# Patient Record
Sex: FEMALE | Race: WHITE | Employment: UNEMPLOYED | URBAN - METROPOLITAN AREA
[De-identification: names, ages, dates, MRNs, and addresses within clinical notes are randomized per-mention and may not be internally consistent; named-entity substitution may affect disease eponyms.]

---

## 2017-06-29 ENCOUNTER — RADIANT APPOINTMENT (OUTPATIENT)
Dept: GENERAL RADIOLOGY | Facility: CLINIC | Age: 32
End: 2017-06-29
Attending: ORTHOPAEDIC SURGERY
Payer: COMMERCIAL

## 2017-06-29 DIAGNOSIS — R52 PAIN: ICD-10-CM

## 2017-06-29 PROCEDURE — 73610 X-RAY EXAM OF ANKLE: CPT | Mod: TC

## 2017-10-26 ENCOUNTER — TRANSFERRED RECORDS (OUTPATIENT)
Dept: HEALTH INFORMATION MANAGEMENT | Facility: CLINIC | Age: 32
End: 2017-10-26

## 2017-11-17 ENCOUNTER — PRE VISIT (OUTPATIENT)
Dept: MATERNAL FETAL MEDICINE | Facility: CLINIC | Age: 32
End: 2017-11-17

## 2018-01-12 ENCOUNTER — TELEPHONE (OUTPATIENT)
Dept: MATERNAL FETAL MEDICINE | Facility: CLINIC | Age: 33
End: 2018-01-12

## 2018-01-12 ENCOUNTER — PRE VISIT (OUTPATIENT)
Dept: MATERNAL FETAL MEDICINE | Facility: CLINIC | Age: 33
End: 2018-01-12

## 2018-01-12 DIAGNOSIS — Z33.1 PREGNANCY, INCIDENTAL: ICD-10-CM

## 2018-01-12 DIAGNOSIS — Q79.60 EHLERS-DANLOS SYNDROME: Primary | ICD-10-CM

## 2018-01-12 DIAGNOSIS — Z98.84 HISTORY OF GASTRIC BYPASS: ICD-10-CM

## 2018-01-12 NOTE — TELEPHONE ENCOUNTER
[facility] contacted Sabina to discuss adding L2 to GC/MFM consult rescheduled from 11/20. Sabina agreeable with coming earlier at 1:30p on 1/16. This ultrasound was added (ok'd per YY).

## 2018-01-16 ENCOUNTER — OFFICE VISIT (OUTPATIENT)
Dept: MATERNAL FETAL MEDICINE | Facility: CLINIC | Age: 33
End: 2018-01-16
Attending: NURSE PRACTITIONER
Payer: COMMERCIAL

## 2018-01-16 ENCOUNTER — HOSPITAL ENCOUNTER (OUTPATIENT)
Dept: ULTRASOUND IMAGING | Facility: CLINIC | Age: 33
Discharge: HOME | End: 2018-01-16
Attending: OBSTETRICS & GYNECOLOGY | Admitting: OBSTETRICS & GYNECOLOGY
Payer: COMMERCIAL

## 2018-01-16 DIAGNOSIS — Q79.60 EHLERS-DANLOS SYNDROME: ICD-10-CM

## 2018-01-16 DIAGNOSIS — O26.90 PREGNANCY RELATED CONDITION, ANTEPARTUM: Primary | ICD-10-CM

## 2018-01-16 DIAGNOSIS — Z98.84 HISTORY OF GASTRIC BYPASS: ICD-10-CM

## 2018-01-16 DIAGNOSIS — Z33.1 PREGNANCY, INCIDENTAL: ICD-10-CM

## 2018-01-16 DIAGNOSIS — O26.90 PREGNANCY RELATED CONDITION, ANTEPARTUM: ICD-10-CM

## 2018-01-16 PROCEDURE — 76811 OB US DETAILED SNGL FETUS: CPT

## 2018-01-16 PROCEDURE — 96040 ZZH GENETIC COUNSELING, EACH 30 MINUTES: CPT | Mod: ZF | Performed by: GENETIC COUNSELOR, MS

## 2018-01-16 PROCEDURE — 76817 TRANSVAGINAL US OBSTETRIC: CPT | Performed by: OBSTETRICS & GYNECOLOGY

## 2018-01-16 RX ORDER — PRENATAL VIT/IRON FUM/FOLIC AC 27MG-0.8MG
1 TABLET ORAL DAILY
COMMUNITY
End: 2018-10-12

## 2018-01-16 NOTE — MR AVS SNAPSHOT
After Visit Summary   1/16/2018    Sabina Herron    MRN: [id number]           Patient Information     Date Of Birth          1985        Visit Information        Provider Department      1/16/2018 3:00 PM Joe Connelly MD [facility]        Today's Diagnoses     Pregnancy related condition, antepartum    -  1    Edinson-Danlos syndrome           Follow-ups after your visit        Your next 10 appointments already scheduled     Feb 16, 2018  1:30 PM CST   (Arrive by 1:15 PM)   MFM US COMPRE SINGLE F/U with [id number]   [facility] Maternal Fetal Medicine Ultrasound - [facility] ([facility])    [address]  [facility] [address] [phone]           Wear comfortable clothes and leave your valuables at home.            Feb 16, 2018  2:00 PM CST   Radiology [facility]   [facility] Maternal Fetal Medicine [facility])    [address]  [facility] [address] [phone]           Please arrive at the time given for your first appointment. This visit is used internally to schedule the physician's time during your ultrasound.              Who to contact     If you have questions or need follow up information about today's clinic visit or your schedule please contact [facility] directly at [phone].  Normal or non-critical lab and imaging results will be communicated to you by MyChart, letter or phone within 4 business days after the clinic has received the results. If you do not hear from us within 7 days, please contact the clinic through MyChart or phone. If you have a critical or abnormal lab result, we will notify you by phone as soon as possible.  Submit refill requests through [employer] or call your pharmacy and they will forward the refill request to us. Please allow 3 business days for your refill to be completed.          Additional Information  "About Your Visit        MyChart Information     Just Dial lets you send messages to your doctor, view your test results, renew your prescriptions, schedule appointments and more. To sign up, go to [email or web address] . Click on \"Log in\" on the left side of the screen, which will take you to the Welcome page. Then click on \"Sign up Now\" on the right side of the page.     You will be asked to enter the access code listed below, as well as some personal information. Please follow the directions to create your username and password.     Your access code is: [email or web address]  Expires: 2018 11:15 AM     Your access code will  in 90 days. If you need help or a new code, please call your [place] clinic or [phone].        Care EveryWhere ID     This is your Care EveryWhere ID. This could be used by other organizations to access your [place] medical records  [id number]        Your Vitals Were     Last Period                   2017 (Approximate)            Blood Pressure from Last 3 Encounters:   14 128/86   07/15/13 123/83    Weight from Last 3 Encounters:   14 108.5 kg (239 lb 3.2 oz)   07/15/13 103 kg (227 lb 1.2 oz)              We Performed the Following     MF Office Visit        Primary Care Provider Office Phone # Fax #    Ron Nicholas [phone]       [place] [address]        Equal Access to Services     [facility] MABLE AH: Hadii aad ku hadasho Soomaali, waaxda luqadaha, qaybta kaalmada adeegyada, waxdomenico hoover. So [facility] [phone].    ATENCIÓN: Si habla español, tiene a hogan disposición servicios gratuitos de asistencia lingüística. Llame al [phone].    We comply with applicable federal civil rights laws and [place] laws. We do not discriminate on the basis of race, color, national origin, age, disability, sex, sexual orientation, or gender identity.            Thank you!     Thank you for " choosing [facility]  for your care. Our goal is always to provide you with excellent care. Hearing back from our patients is one way we can continue to improve our services. Please take a few minutes to complete the written survey that you may receive in the mail after your visit with us. Thank you!             Your Updated Medication List - Protect others around you: Learn how to safely use, store and throw away your medicines at www.disposemymeds.org.          This list is accurate as of: 1/16/18 11:59 PM.  Always use your most recent med list.                   Brand Name Dispense Instructions for use Diagnosis    AMBIEN PO      Take 10 mg by mouth At Bedtime        diazepam 5 MG tablet    VALIUM     Take 5 mg by mouth every 6 hours as needed. Up to 4 times daily        fentaNYL 75 mcg/hr 72 hr patch    DURAGESIC     Place 1 patch onto the skin every 72 hours.        LIDODERM 5 % Patch   Generic drug:  lidocaine      Place 1 patch onto the skin as needed.        LUNESTA 3 MG tablet   Generic drug:  eszopiclone      Take 1 tablet by mouth At Bedtime.        LYRICA 150 MG capsule   Generic drug:  pregabalin      Take 1 capsule by mouth 2 times daily.        oxyCODONE IR 5 MG tablet    ROXICODONE     Take 3 tablets by mouth daily. Up to 10 times daily        prenatal multivitamin plus iron 27-0.8 MG Tabs per tablet      Take 1 tablet by mouth daily        SIMPLY SLEEP 25 MG Tabs   Generic drug:  diphenhydrAMINE HCl (Sleep)      Take 1 tablet by mouth as needed.        TIZANIDINE HCL PO      Take 4 mg by mouth 4 times daily        TRAZODONE HCL PO      Take 1 tablet by mouth daily.

## 2018-01-16 NOTE — PROGRESS NOTES
"Maternal Fetal Medicine Consult Note    Name: Sabina Herron  : 1985  MRN: [id number]    Date: 2018    Referring Physician: THAD Kerr  Reason for Referral: Edinson-Danlos, Type III (hypermobility type)    Thank you very much for your request for consultation regarding management of Ms. Sabina Herron, whose current pregnancy is complicated by Edinson-Danlos, Type III.  Please see Imaging section for results of ultrasound evaluation performed today.    HPI: As you know, Ms. Sabina Herron is a pleasant 32 year old  @ 19w4 days by first trimester ultrasound. She was seen today for a history of Edinson-Danlos that was recently diagnosed in . Diagnosis was made after history of significant joint pain and bulging lumbar disc. She believes that her mother is likely also affected, though her mother has not yet had confirmatory testing. She does not believe her 6 year old son is affected. She reported a normal spontaneous vaginal delivery at term in that pregnancy without complication.  She does not recall if she has had an echo performed within the past 5 years.  She denies any family history of early stroke or obstetric complication.    She is doing well today without complaints including cramping, bleeding, or leakage of fluid. She does report that she often has low blood pressures, and feels dizzy occasionally when standing up too rapidly.    ObHx:   G1 -  \"Zay\" , 40wga  G2 - current    GynHx: Denies  PMHx: Edinson-Danlos, asthma, anxiety/depression, migraines  PSHx: Miles-en-Y, tonsillectomy, chondrectomy spine, hand surgery  Meds: Zofran, zantac, zanaflex, prenatal vitamins  Allergies:  NKDA  SocHx: denies  FamHx:  Denies any history in her or her partner's family of birth defects, developmental delay    Impression:    We briefly discussed that Edinson-Danlos syndrome is typically inherited in an autosomal dominant manner, which appears to be Sabina's case if her mother does " indeed have EDS, although some cases are inherited in an autosomal recessive pattern.  We reviewed that there are several subtypes of EDS and a wide array of clinical manifestations related to abnormal connective tissue.  Edinson-Danlos syndrome in general has been associated with a variety of obstetric complications including PPROM,  birth, cervical insufficiency, uterine rupture, and aortic root dilation with subsequent dissection.  The spontaneous rupture of medium and large size arteries, and aortic dissection, are most commonly seen with the vascular Type 4 Edinson-Danlos.  Patients with type III hypermobility syndrome, as is the case with Ms. Herron, typically do well in pregnancy. However, we still do recommend obtaining an echocardiogram due to risk of aortic dilation in these patients.  Due to the propensity for joint and musculoskeletal discomfort, some patients may have exacerbated symptoms during pregnancy. Cervical lengths are also often performed as some patients with Edinson-Danlos have associated cervical insufficiency, Ms. Herron had a normal cervical length seen on today's ultrasound.  I would also recommend avoiding overextesion/flexion of joints in labor if Sabina has an epidural, but  section is recommended for routine obstetric indications as long as the maternal echocardiogram returns within normal limits.    Due to her history of past gastric bypass, screening for GDM may be more difficult, as women after gastric bypass may experience dumping syndrome with the glucola 50 gm.  In these cases I would recommend checking blood sugars QID (fasting and 2 hour post prandial) for a week if she cannot tolerate the glucose load from glucola 50g (she reported that she had done this test with her last pregnancy and did not experience dumping syndrome).  Total weight gain in the current pregnancy is also recommended between 15-25 pounds.      In summary, I recommend:    1. Follow up  ultrasound in one month (scheduled with the patient today) to follow up on anatomy views that were unable to be seen  2. Patient had a normal cervical length today and a prior full term delivery, so we discussed at this point would not do any further cervical evaluation unless Sabina was symptomatic.  3. Scheduling an echocardiogram as soon as possible  4. Glucose screening due to obesity and history of Miles-en-Y and concern for dumping syndrome can substitute QID fingersticks for one week to assess blood sugars    Thank you for the opportunity to participate in the care of this patient. If you have questions regarding today s evaluation or if we can be of further service, please contact the [facility].    I acted as a scribe for  during today's visit.     Mae Ca MD  Maternal Fetal Medicine Fellow    The documentation recorded by the scribe accurately reflects the services I personally performed and the decisions made by me.    Joe Connelly    I spent a total of 30 minutes face-to-face with Sabina Herron during today's office visit.  Over 50% of this time was spent counseling the patient and/or coordinating care regarding pregnancy complicated by maternal EDS hypermobility type.  See note for details.    Joe Connelly

## 2018-01-16 NOTE — PROGRESS NOTES
[facility]  Genetic Counseling Consult    Patient: Sabina Herron YOB: 1985       Sabina Herron was seen, along with her partner Jason, at the [facility] for genetic consultation as part of her appointment for comprehensive ultrasound.  The indication for genetic counseling is personal and family history of Edinson Danlos syndrome type 3 (hypermobility type) and accompanied medications.       Impression/Plan:   Sabina and Jason are excited to hear baby's heart beat on the ultrasound today and to find out the sex.  Sabina declined maternal serum screening today (Quad screen) and carrier screening as she felt the possibility of abnormal results would make her too anxious.    1. Sabina has a history of Edinson Danlos syndrome (EDS) type 3 (hypermobility type).    2. Sabina had a comprehensive (level II) ultrasound today.  Please see the ultrasound report for further details.    3. The patient declines genetic amniocentesis and maternal serum screening today.    Pregnancy History:   /Parity:    Age at Delivery: 33 year old  JUAN LUIS: 2018, by Ultrasound  Gestational Age: 20w1d    No significant complications or exposures were reported in the current pregnancy.    Medical/Social History:   Sabina is currently taking Tizanidine HCL for management of pain/muscle spasms, Zofran to manage nausea, and an antacid.  Sabina s reported medical history is not expected to impact pregnancy management or risks to fetal development.  Sabina is currently on disability due to her diagnosis of EDS and reports no exposures.  Her family is in [place].  Jason works at a concrete plant, and his family lives in the area.  The couple has been together for about 11 months.       Family History/Risk Assessment:   A three-generation pedigree was obtained, and is scanned under the  Media  tab.   The following significant findings were reported  by Sabina and Jason:    Sabina and her mother both have a diagnosis of Edinson Danlos syndrome type 3 (hypermobility type).  This condition is likely inherited in an autosomal dominant pattern, meaning children have a 50% chance of inheriting the condition from a parent.    Jason's oldest sister and one of her daughters has Stickler syndrome, including symptoms of growth deficiency and cleft palate.  This condition is usually inherited in an autosomal dominant pattern, meaning children have a 50% chance of inheriting the condition from a parent.  Since neither Jason nor his parents have symptoms of Stickler syndrome, it is likely that it occurred sporadically in his sister and unlikely that Jason and Sabina's pregnancy will be affected.  Negative genetic testing of Jason's parents would confirm that the condition occurred sporadically in his sister and that other family members, aside from his sister's children, are not at risk of inheriting the condition.    Jason's paternal aunt has a history of breast cancer in her 40's.  We discussed that cancers that develop at younger ages (50's or younger) are more likely to be caused by a genetic predisposition.  Cancer genetic counseling is available for family members who are interested in learning more about an inherited risk for cancer.     Otherwise, the reported family history is negative for multiple miscarriages, stillbirths, birth defects, mental retardation, known genetic conditions, and consanguinity.       Carrier Screening:   The patient reports that she and the father of the pregnancy have  ancestry:     Cystic fibrosis is an autosomal recessive genetic condition that occurs with increased frequency in individuals of  ancestry and carrier screening for this condition is available.  In addition,  screening in the [facility] includes cystic fibrosis.      Expanded carrier screening for mutations in a large panel of genes associated with  autosomal recessive conditions including cystic fibrosis, spinal muscular atrophy, and others, is now available.      The patient has declined the carrier screening options reviewed today.       Risk Assessment:     See family history for risk assessment.      Sabina did not have maternal serum screening earlier in pregnancy to assess for risks of chromosomal conditions, such as Down syndrome, trisomy 18, and trisomy 13.        Testing Options:   We discussed the following options:   Quad screen:     Maternal plasma AFP, hCG, estriol, and inhibin measurement between 15-24 weeks gestation    Provides risk assessment for fetal Down syndrome, trisomy 18, and neural tube defects     Genetic Amniocentesis    Invasive procedure typically performed in the second trimester by which amniotic fluid is obtained for the purpose of chromosome analysis and/or other prenatal genetic analysis    Diagnostic results; >99% sensitivity for fetal chromosome abnormalities    AFAFP measurement tests for open neural tube defects    We reviewed the benefits and limitations of this testing.  Screening tests provide a risk assessment specific to the pregnancy for certain fetal chromosome abnormalities, but cannot definitively diagnose or exclude a fetal chromosome abnormality.  Follow-up genetic counseling and consideration of diagnostic testing is recommended with any abnormal screening result.     Diagnostic tests carry inherent risks- including risk of miscarriage- that require careful consideration.  These tests can detect fetal chromosome abnormalities with greater than 99% certainty.  Results can be compromised by maternal cell contamination or mosaicism, and are limited by the resolution of cytogenetic G-banding technology.  There is no screening nor diagnostic test that can detect all forms of birth defects or mental disability.     It was a pleasure to be involved with Sabina s care. Face-to-face time of the meeting was 45  minutes.    Nelia Espinoza  Genetic Counseling Intern    This note was documented by Trudy Espinoza, Genetic Counseling Intern scribing for Eve Bello, Genetic Counselor, who has reviewed this document and is in agreement with what is stated.

## 2018-01-16 NOTE — NURSING NOTE
Sabina and partner, Jason, seen at [facility] today for GC/L2/TV/MFM consult d/t Hx sadia danlos, type 3, medication use in pregnancy, Hx gastric bypass in 2005 (see notes). Sabina states she uses medicinal marijuana for nausea 2-3x/week. Dr Ca and Dr Connelly met with family. Plan for her to return to [facility] in 4 weeks for RL2. She will get maternal ECHO with primary provider. Patient discharged stable and ambulatory. ALYSSA STOLL RN

## 2018-09-29 ENCOUNTER — TRANSFERRED RECORDS (OUTPATIENT)
Dept: SURGERY | Facility: CLINIC | Age: 33
End: 2018-09-29

## 2018-10-10 ENCOUNTER — TRANSFERRED RECORDS (OUTPATIENT)
Dept: SURGERY | Facility: CLINIC | Age: 33
End: 2018-10-10

## 2018-10-12 ENCOUNTER — TELEPHONE (OUTPATIENT)
Dept: SURGERY | Facility: CLINIC | Age: 33
End: 2018-10-12

## 2018-10-12 ENCOUNTER — OFFICE VISIT (OUTPATIENT)
Dept: SURGERY | Facility: CLINIC | Age: 33
End: 2018-10-12
Payer: MEDICARE

## 2018-10-12 VITALS
DIASTOLIC BLOOD PRESSURE: 80 MMHG | HEART RATE: 99 BPM | BODY MASS INDEX: 35.32 KG/M2 | SYSTOLIC BLOOD PRESSURE: 130 MMHG | RESPIRATION RATE: 16 BRPM | WEIGHT: 212 LBS | OXYGEN SATURATION: 100 % | HEIGHT: 65 IN

## 2018-10-12 DIAGNOSIS — K80.20 CALCULUS OF GALLBLADDER WITHOUT CHOLECYSTITIS WITHOUT OBSTRUCTION: Primary | ICD-10-CM

## 2018-10-12 PROCEDURE — 99204 OFFICE O/P NEW MOD 45 MIN: CPT | Performed by: SURGERY

## 2018-10-12 ASSESSMENT — ENCOUNTER SYMPTOMS
VOMITING: 1
WEIGHT LOSS: 1
DIARRHEA: 1
NAUSEA: 1
APPETITE CHANGE: 1
ABDOMINAL PAIN: 1
ARTHRALGIAS: 1
BRUISES/BLEEDS EASILY: 1

## 2018-10-12 NOTE — PROGRESS NOTES
HPI      ROS (Review of Systems):      Positive for weight loss and appetite change.   GASTROINTESTINAL: Positive for nausea, vomiting, abdominal pain and diarrhea.   MUSCULOSKELETAL: Positive for arthralgias and back pain.   Endo/Heme/Allergies: Bruises/bleeds easily.          Physical Exam

## 2018-10-12 NOTE — TELEPHONE ENCOUNTER
Type of surgery: LAPAROSCOPIC CHOLECYSTECTOMY   Location of surgery: [facility]  Date and time of surgery: 10/24/2018 @ 2:20 PM   Surgeon: Beba Coyne MD    Pre-Op Appt Date: PATIENT TO SCHEDULE    Post-Op Appt Date: PATIENT TO SCHEDULE     Packet sent out: PACKET AND SOAP GIVEN TO PATIENT   Pre-cert/Authorization completed:  Not Applicable  Date: 10/12/2018     LAPAROSCOPIC CHOLECYSTECTOMY    GENERAL PT INST TO HAVE H&P WITH DR MAYER 120 MIN REQ PA ASSIST JLS NMS

## 2018-10-12 NOTE — PROGRESS NOTES
[facility]  New Patient Office Visit    Assessment and Plan:    Sabina Herron is a 33 year old female with PMH significant for Edinson Danlos, chronic pain on medical cannabis, gastric bypass in 2005 and subsequent small bowel resection for internal hernia in 2012 seen in consultation for cholelithiasis at the request of Rogerio Suarez MD.     It is my impression that Sabina has possibly symptomatic gallstones.   I have offered her a laparoscopic, possible open cholecystectomy.  I discussed with her there is a possibility her vomiting is due to another GI issue, cannabanoid hyperemesis syndrome, or her bypass. However she has had thorough workup including CT abd which did not show any bowel dilation to suggest obstructions or problems with her bypass, and EGD which was normal, and she has not changed the amount of cannabis she has been using recently.    She is aware she has a higher risk of injury to bowel and conversion to open due to adhesions from prior surgeries.    We have discussed the indication, alternatives, risks and expected recovery.  Specifically we have discussed incisions, scarring, postoperative infections, anesthesia, bleeding, blood transfusion, open conversion, common bile duct injury, injury to intra-abdominal organs, adhesions that can lead to bowel obstruction, retained common bile duct stone, bile leak, DVT, PE, hernia, post cholecystectomy diarrhea, postoperative dietary restrictions and physical limitations.  We have discussed the recommended interventions and treatments for these complications.  All questions have been answered to the best of my ability.           We will schedule surgery at the patient's convenience.  Needs a preop H&P to be performed by PCP.    Chief complaint:  Nausea and vomiting    HPI:  Sabina Herron is a 33 year old female who presents with the main complaint of nausea and vomiting for the last few weeks. She has intermittent epigastric pain as  well for several weeks. Her symptoms do not appear to be associated with eating or activity.  Positive for associated symptoms of anorexia and diarrhea.  Negative for associated symptoms of constipation.  She does not have a history of jaundice or dark urine.  She  has not had pancreatitis in the past.        She has been the the ED in [place] multiple times and was admitted at [facility] 9/27-9/28 for intractable vomiting. She saw [facility] physician 10/3 - they obtained a CT scan which showed distended gallbladder and gallstones, without other abnormality, and performed an EGD on 10/11 which was normal.    She had her 2nd child in May, 2018.    She started using medical marijuana in July and that has been improving her chronic pain , she has now been able to be off opioids.    Past Medical History:   has a past medical history of Anxiety; Depressive disorder; Edinson-Danlos syndrome; Migraines; Panic disorder; and Sleep apnea.    Past Surgical History:  Past Surgical History:   Procedure Laterality Date     GI SURGERY       HAND SURGERY       HERNIA REPAIR       TONSILLECTOMY     Gastric bypass (open) 2005  Ex lap, small bowel resection for internal hernia 2012    Social History:  Social History     Social History     Marital status: Single     Spouse name: N/A     Number of children: N/A     Years of education: N/A     Occupational History     Not on file.     Social History Main Topics     Smoking status: Never Smoker     Smokeless tobacco: Never Used     Alcohol use Yes      Comment: socially     Drug use: 3.00 per week     Special: Marijuana      Comment: pt uses marijuana 2-3x/week     Sexual activity: Not on file     Other Topics Concern     Not on file     Social History Narrative   , stay at home mother  2 children, ages 8 and 5months  Nonsmoker  Rare etoh  Medical cannabis    Family History:  Family History   Problem Relation Age of Onset     Hypertension Mother      No FH bleeding or clotting  "problems or reactions to anesthesia    Review of Systems:  The 10 point review of systems is negative other than noted in the HPI and above.    Physical Exam:  Vitals: /80 (BP Location: Right arm, Cuff Size: Adult Large)  Pulse 99  Resp 16  Ht 1.645 m (5' 4.75\")  Wt 96.2 kg (212 lb)  LMP 10/10/2018  SpO2 100%  Breastfeeding? No  BMI 35.55 kg/m2  BMI= Body mass index is 35.55 kg/(m^2).  General - Well developed, well nourished female in no apparent distress  HEENT:  Head normocephalic and atraumatic, pupils equal and round, conjunctivae clear, no scleral icterus, mucous membranes moist, external ears and nose normal  Neck: Supple without thyromegaly or masses  Lymphatic: No cervical, or supraclavicular lymphadenopathy  Pulmonary: Breathing comfortably on room air  CV: Regular rate and rhythm  Abdomen:   Well healed midline scar. soft, obese, non-distended with no tenderness noted. no masses palpated.  Musculoskeletal:  Moves all extremities equally, arm without edema  Neurologic: alert, speech is clear, nonfocal  Psychiatric: Mood and affect appropriate  Skin: Without lesions, rashes or juandice    Relevant labs:  Labs from OSH 9/27 reviewed significant for  Normal LFTs, lipase  Normal electrolytes  Elevated WBC 12.2      Imaging:  All imaging studies reviewed by me.  Ultrasound  Abdomen 9/29/18: cholelithiasis in the gallbladder neck, possible polyp in fundus. CBD 4mm.    CT abd 10/8/18:distended gallbladder with hyperdense objects, likely stones. Changes of bypass without bowel distension.      EGD 10/11/18: normal esophagus, gastric bypass anatomy without abnormality      Time spent with the patient with greater that 50% of the time in discussion was 30 minutes.     Beba Coyne MD  [facility] [place]    Please route or send letter to:  Primary Care Provider (PCP) and Referring Provider    "

## 2018-10-12 NOTE — MR AVS SNAPSHOT
"              After Visit Summary   10/12/2018    Sabina Herron    MRN: [id number]           Patient Information     Date Of Birth          1985        Visit Information        Provider Department      10/12/2018 9:30 AM Beba Coyne MD [facility] General Surgery      Today's Diagnoses     Calculus of gallbladder without cholecystitis without obstruction    -  1       Follow-ups after your visit        Your next 10 appointments already scheduled     Oct 24, 2018   Procedure with Beba Coyne MD   [facility] (--)    [address]  [facility] [address]   [phone]            Oct 24, 2018  2:15 PM CDT   [facility] Same Day Surgery with Beba Coyne MD, Hilda Mckenna PA-C   [facility] Scheduling ([facility])    [facility]uling ([facility])   [phone]              Who to contact     If you have questions or need follow up information about today's clinic visit or your schedule please contact [facility] directly at [phone].  Normal or non-critical lab and imaging results will be communicated to you by [employer], letter or phone within 4 business days after the clinic has received the results. If you do not hear from us within 7 days, please contact the clinic through [employer] or phone. If you have a critical or abnormal lab result, we will notify you by phone as soon as possible.  Submit refill requests through [employer] or call your pharmacy and they will forward the refill request to us. Please allow 3 business days for your refill to be completed.          Additional Information About Your Visit        [employer] Information     [employer] lets you send messages to your doctor, view your test results, renew your prescriptions, schedule appointments and more. To sign up, go to [email or web address] . Click on \"Log " "in\" on the left side of the screen, which will take you to the Welcome page. Then click on \"Sign up Now\" on the right side of the page.     You will be asked to enter the access code listed below, as well as some personal information. Please follow the directions to create your username and password.     Your access code is: [email or web address]  Expires: 1/10/2019 11:20 AM     Your access code will  in 90 days. If you need help or a new code, please call your [facility] [place] or [phone].        Care EveryWhere ID     This is your Care EveryWhere ID. This could be used by other organizations to access your [facility] medical records  [id number]        Your Vitals Were     Pulse Respirations Height Last Period Pulse Oximetry Breastfeeding?    99 16 1.645 m (5' 4.75\") 10/10/2018 100% No    BMI (Body Mass Index)                   35.55 kg/m2            Blood Pressure from Last 3 Encounters:   10/12/18 130/80   14 128/86   07/15/13 123/83    Weight from Last 3 Encounters:   10/12/18 96.2 kg (212 lb)   14 108.5 kg (239 lb 3.2 oz)   07/15/13 103 kg (227 lb 1.2 oz)              Today, you had the following     No orders found for display       Primary Care Provider Office Phone # Fax #    Ron Nicholas [phone]       [place] [address]        Equal Access to Services     [facility] : Hadii teresa liuo Somarin, waaxda luqadaha, qaybta kaalmada adeegyamarina, jaguar hoover. So [facility] [phone].    ATENCIÓN: Si habla español, tiene a hogan disposición servicios gratuitos de asistencia lingüística. Llame al [phone].    We comply with applicable federal civil rights laws and [place] laws. We do not discriminate on the basis of race, color, national origin, age, disability, sex, sexual orientation, or gender identity.            Thank you!     Thank you for choosing [facility]  for your care. Our goal is " always to provide you with excellent care. Hearing back from our patients is one way we can continue to improve our services. Please take a few minutes to complete the written survey that you may receive in the mail after your visit with us. Thank you!             Your Updated Medication List - Protect others around you: Learn how to safely use, store and throw away your medicines at www.disposemymeds.org.          This list is accurate as of 10/12/18 11:20 AM.  Always use your most recent med list.                   Brand Name Dispense Instructions for use Diagnosis    medical cannabis (Patient's own supply.  Not a prescription)      1 Dose See Admin Instructions (This is NOT a prescription, and does not certify that the patient has a qualifying medical condition for medical cannabis.  The purpose of this order is  to document that the patient reports taking medical cannabis.)        TIZANIDINE HCL PO      Take 4 mg by mouth 4 times daily

## 2018-10-24 ENCOUNTER — APPOINTMENT (OUTPATIENT)
Dept: SURGERY | Facility: PHYSICIAN GROUP | Age: 33
End: 2018-10-24
Payer: MEDICARE

## 2018-10-24 ENCOUNTER — ANESTHESIA (OUTPATIENT)
Dept: SURGERY | Facility: CLINIC | Age: 33
End: 2018-10-24
Payer: MEDICARE

## 2018-10-24 ENCOUNTER — ANESTHESIA EVENT (OUTPATIENT)
Dept: SURGERY | Facility: CLINIC | Age: 33
End: 2018-10-24
Payer: MEDICARE

## 2018-10-24 ENCOUNTER — HOSPITAL ENCOUNTER (OUTPATIENT)
Facility: CLINIC | Age: 33
Discharge: HOME | End: 2018-10-24
Attending: SURGERY | Admitting: SURGERY
Payer: MEDICARE

## 2018-10-24 VITALS
HEART RATE: 91 BPM | BODY MASS INDEX: 35.79 KG/M2 | HEIGHT: 63 IN | DIASTOLIC BLOOD PRESSURE: 78 MMHG | OXYGEN SATURATION: 98 % | TEMPERATURE: 98.2 F | RESPIRATION RATE: 12 BRPM | SYSTOLIC BLOOD PRESSURE: 156 MMHG | WEIGHT: 202 LBS

## 2018-10-24 DIAGNOSIS — Z90.49 S/P CHOLECYSTECTOMY: Primary | ICD-10-CM

## 2018-10-24 LAB — HCG UR QL: NEGATIVE

## 2018-10-24 PROCEDURE — 71000027 ZZH RECOVERY PHASE 2 EACH 15 MINS: Performed by: SURGERY

## 2018-10-24 PROCEDURE — 36000058 ZZH SURGERY LEVEL 3 EA 15 ADDTL MIN: Performed by: SURGERY

## 2018-10-24 PROCEDURE — A9270 NON-COVERED ITEM OR SERVICE: HCPCS | Mod: GY | Performed by: ANESTHESIOLOGY

## 2018-10-24 PROCEDURE — 71000013 ZZH RECOVERY PHASE 1 LEVEL 1 EA ADDTL HR: Performed by: SURGERY

## 2018-10-24 PROCEDURE — 27210794 ZZH OR GENERAL SUPPLY STERILE: Performed by: SURGERY

## 2018-10-24 PROCEDURE — 25000132 ZZH RX MED GY IP 250 OP 250 PS 637: Mod: GY | Performed by: ANESTHESIOLOGY

## 2018-10-24 PROCEDURE — 71000012 ZZH RECOVERY PHASE 1 LEVEL 1 FIRST HR: Performed by: SURGERY

## 2018-10-24 PROCEDURE — 25000128 H RX IP 250 OP 636: Performed by: SURGERY

## 2018-10-24 PROCEDURE — 25000125 ZZHC RX 250: Performed by: ANESTHESIOLOGY

## 2018-10-24 PROCEDURE — 25000128 H RX IP 250 OP 636: Performed by: NURSE ANESTHETIST, CERTIFIED REGISTERED

## 2018-10-24 PROCEDURE — 25000128 H RX IP 250 OP 636: Performed by: ANESTHESIOLOGY

## 2018-10-24 PROCEDURE — 25000125 ZZHC RX 250: Performed by: SURGERY

## 2018-10-24 PROCEDURE — 47562 LAPAROSCOPIC CHOLECYSTECTOMY: CPT | Mod: AS | Performed by: PHYSICIAN ASSISTANT

## 2018-10-24 PROCEDURE — 37000009 ZZH ANESTHESIA TECHNICAL FEE, EACH ADDTL 15 MIN: Performed by: SURGERY

## 2018-10-24 PROCEDURE — 40000306 ZZH STATISTIC PRE PROC ASSESS II: Performed by: SURGERY

## 2018-10-24 PROCEDURE — 25000566 ZZH SEVOFLURANE, EA 15 MIN: Performed by: SURGERY

## 2018-10-24 PROCEDURE — 47562 LAPAROSCOPIC CHOLECYSTECTOMY: CPT | Performed by: SURGERY

## 2018-10-24 PROCEDURE — 25800025 ZZH RX 258: Performed by: SURGERY

## 2018-10-24 PROCEDURE — 36000056 ZZH SURGERY LEVEL 3 1ST 30 MIN: Performed by: SURGERY

## 2018-10-24 PROCEDURE — 81025 URINE PREGNANCY TEST: CPT | Performed by: ANESTHESIOLOGY

## 2018-10-24 PROCEDURE — 37000008 ZZH ANESTHESIA TECHNICAL FEE, 1ST 30 MIN: Performed by: SURGERY

## 2018-10-24 PROCEDURE — 25000125 ZZHC RX 250: Performed by: NURSE ANESTHETIST, CERTIFIED REGISTERED

## 2018-10-24 PROCEDURE — 88304 TISSUE EXAM BY PATHOLOGIST: CPT | Performed by: SURGERY

## 2018-10-24 PROCEDURE — 88304 TISSUE EXAM BY PATHOLOGIST: CPT | Mod: 26 | Performed by: SURGERY

## 2018-10-24 RX ORDER — OXYCODONE HYDROCHLORIDE 5 MG/1
5-10 TABLET ORAL EVERY 4 HOURS PRN
Qty: 20 TABLET | Refills: 0 | Status: SHIPPED | OUTPATIENT
Start: 2018-10-24 | End: 2018-10-29

## 2018-10-24 RX ORDER — FENTANYL CITRATE 50 UG/ML
INJECTION, SOLUTION INTRAMUSCULAR; INTRAVENOUS PRN
Status: DISCONTINUED | OUTPATIENT
Start: 2018-10-24 | End: 2018-10-24

## 2018-10-24 RX ORDER — PROMETHAZINE HYDROCHLORIDE 25 MG/ML
25 INJECTION INTRAMUSCULAR; INTRAVENOUS ONCE
Status: COMPLETED | OUTPATIENT
Start: 2018-10-24 | End: 2018-10-24

## 2018-10-24 RX ORDER — KETOROLAC TROMETHAMINE 30 MG/ML
INJECTION, SOLUTION INTRAMUSCULAR; INTRAVENOUS PRN
Status: DISCONTINUED | OUTPATIENT
Start: 2018-10-24 | End: 2018-10-24

## 2018-10-24 RX ORDER — SODIUM CHLORIDE, SODIUM LACTATE, POTASSIUM CHLORIDE, CALCIUM CHLORIDE 600; 310; 30; 20 MG/100ML; MG/100ML; MG/100ML; MG/100ML
INJECTION, SOLUTION INTRAVENOUS CONTINUOUS
Status: DISCONTINUED | OUTPATIENT
Start: 2018-10-24 | End: 2018-10-24 | Stop reason: HOSPADM

## 2018-10-24 RX ORDER — LIDOCAINE 40 MG/G
CREAM TOPICAL
Status: DISCONTINUED | OUTPATIENT
Start: 2018-10-24 | End: 2018-10-24 | Stop reason: HOSPADM

## 2018-10-24 RX ORDER — GLYCOPYRROLATE 0.2 MG/ML
INJECTION, SOLUTION INTRAMUSCULAR; INTRAVENOUS PRN
Status: DISCONTINUED | OUTPATIENT
Start: 2018-10-24 | End: 2018-10-24

## 2018-10-24 RX ORDER — FENTANYL CITRATE 50 UG/ML
50 INJECTION, SOLUTION INTRAMUSCULAR; INTRAVENOUS ONCE
Status: COMPLETED | OUTPATIENT
Start: 2018-10-24 | End: 2018-10-24

## 2018-10-24 RX ORDER — ONDANSETRON 4 MG/1
4 TABLET, ORALLY DISINTEGRATING ORAL EVERY 30 MIN PRN
Status: DISCONTINUED | OUTPATIENT
Start: 2018-10-24 | End: 2018-10-24 | Stop reason: HOSPADM

## 2018-10-24 RX ORDER — FENTANYL CITRATE 50 UG/ML
25-50 INJECTION, SOLUTION INTRAMUSCULAR; INTRAVENOUS
Status: DISCONTINUED | OUTPATIENT
Start: 2018-10-24 | End: 2018-10-24 | Stop reason: HOSPADM

## 2018-10-24 RX ORDER — HYDRALAZINE HYDROCHLORIDE 20 MG/ML
2.5-5 INJECTION INTRAMUSCULAR; INTRAVENOUS EVERY 10 MIN PRN
Status: DISCONTINUED | OUTPATIENT
Start: 2018-10-24 | End: 2018-10-24 | Stop reason: HOSPADM

## 2018-10-24 RX ORDER — ONDANSETRON 2 MG/ML
4 INJECTION INTRAMUSCULAR; INTRAVENOUS ONCE
Status: COMPLETED | OUTPATIENT
Start: 2018-10-24 | End: 2018-10-24

## 2018-10-24 RX ORDER — LABETALOL HYDROCHLORIDE 5 MG/ML
10 INJECTION, SOLUTION INTRAVENOUS
Status: DISCONTINUED | OUTPATIENT
Start: 2018-10-24 | End: 2018-10-24 | Stop reason: HOSPADM

## 2018-10-24 RX ORDER — DIAZEPAM 5 MG
5 TABLET ORAL ONCE
Status: COMPLETED | OUTPATIENT
Start: 2018-10-24 | End: 2018-10-24

## 2018-10-24 RX ORDER — MEPERIDINE HYDROCHLORIDE 50 MG/ML
12.5 INJECTION INTRAMUSCULAR; INTRAVENOUS; SUBCUTANEOUS
Status: DISCONTINUED | OUTPATIENT
Start: 2018-10-24 | End: 2018-10-24 | Stop reason: HOSPADM

## 2018-10-24 RX ORDER — NALOXONE HYDROCHLORIDE 0.4 MG/ML
.1-.4 INJECTION, SOLUTION INTRAMUSCULAR; INTRAVENOUS; SUBCUTANEOUS
Status: DISCONTINUED | OUTPATIENT
Start: 2018-10-24 | End: 2018-10-24 | Stop reason: HOSPADM

## 2018-10-24 RX ORDER — ONDANSETRON 2 MG/ML
4 INJECTION INTRAMUSCULAR; INTRAVENOUS EVERY 30 MIN PRN
Status: DISCONTINUED | OUTPATIENT
Start: 2018-10-24 | End: 2018-10-24 | Stop reason: HOSPADM

## 2018-10-24 RX ORDER — LIDOCAINE HYDROCHLORIDE 10 MG/ML
INJECTION, SOLUTION INFILTRATION; PERINEURAL PRN
Status: DISCONTINUED | OUTPATIENT
Start: 2018-10-24 | End: 2018-10-24

## 2018-10-24 RX ORDER — EPHEDRINE SULFATE 50 MG/ML
25 INJECTION, SOLUTION INTRAVENOUS ONCE
Status: COMPLETED | OUTPATIENT
Start: 2018-10-24 | End: 2018-10-24

## 2018-10-24 RX ORDER — NEOSTIGMINE METHYLSULFATE 1 MG/ML
VIAL (ML) INJECTION PRN
Status: DISCONTINUED | OUTPATIENT
Start: 2018-10-24 | End: 2018-10-24

## 2018-10-24 RX ORDER — BUPIVACAINE HYDROCHLORIDE AND EPINEPHRINE 2.5; 5 MG/ML; UG/ML
INJECTION, SOLUTION EPIDURAL; INFILTRATION; INTRACAUDAL; PERINEURAL PRN
Status: DISCONTINUED | OUTPATIENT
Start: 2018-10-24 | End: 2018-10-24 | Stop reason: HOSPADM

## 2018-10-24 RX ORDER — OXYCODONE HYDROCHLORIDE 5 MG/1
5 TABLET ORAL
Status: DISCONTINUED | OUTPATIENT
Start: 2018-10-24 | End: 2018-10-24

## 2018-10-24 RX ORDER — ONDANSETRON 2 MG/ML
INJECTION INTRAMUSCULAR; INTRAVENOUS PRN
Status: DISCONTINUED | OUTPATIENT
Start: 2018-10-24 | End: 2018-10-24

## 2018-10-24 RX ORDER — PROPOFOL 10 MG/ML
INJECTION, EMULSION INTRAVENOUS PRN
Status: DISCONTINUED | OUTPATIENT
Start: 2018-10-24 | End: 2018-10-24

## 2018-10-24 RX ORDER — OXYCODONE HYDROCHLORIDE 5 MG/1
10 TABLET ORAL
Status: COMPLETED | OUTPATIENT
Start: 2018-10-24 | End: 2018-10-24

## 2018-10-24 RX ORDER — HYDROMORPHONE HYDROCHLORIDE 1 MG/ML
0.2 INJECTION, SOLUTION INTRAMUSCULAR; INTRAVENOUS; SUBCUTANEOUS ONCE
Status: COMPLETED | OUTPATIENT
Start: 2018-10-24 | End: 2018-10-24

## 2018-10-24 RX ORDER — CEFAZOLIN SODIUM 2 G/100ML
2 INJECTION, SOLUTION INTRAVENOUS
Status: COMPLETED | OUTPATIENT
Start: 2018-10-24 | End: 2018-10-24

## 2018-10-24 RX ORDER — VENLAFAXINE HYDROCHLORIDE 37.5 MG/1
37.5 TABLET, EXTENDED RELEASE ORAL DAILY
Status: ON HOLD | COMMUNITY
End: 2018-11-04

## 2018-10-24 RX ORDER — DEXAMETHASONE SODIUM PHOSPHATE 4 MG/ML
INJECTION, SOLUTION INTRA-ARTICULAR; INTRALESIONAL; INTRAMUSCULAR; INTRAVENOUS; SOFT TISSUE PRN
Status: DISCONTINUED | OUTPATIENT
Start: 2018-10-24 | End: 2018-10-24

## 2018-10-24 RX ORDER — CEFAZOLIN SODIUM 1 G/3ML
1 INJECTION, POWDER, FOR SOLUTION INTRAMUSCULAR; INTRAVENOUS SEE ADMIN INSTRUCTIONS
Status: DISCONTINUED | OUTPATIENT
Start: 2018-10-24 | End: 2018-10-24 | Stop reason: HOSPADM

## 2018-10-24 RX ADMIN — ROCURONIUM BROMIDE 10 MG: 10 INJECTION INTRAVENOUS at 16:09

## 2018-10-24 RX ADMIN — LIDOCAINE HYDROCHLORIDE 50 MG: 10 INJECTION, SOLUTION INFILTRATION; PERINEURAL at 15:22

## 2018-10-24 RX ADMIN — OXYCODONE HYDROCHLORIDE 10 MG: 5 TABLET ORAL at 17:40

## 2018-10-24 RX ADMIN — DEXAMETHASONE SODIUM PHOSPHATE 4 MG: 4 INJECTION, SOLUTION INTRA-ARTICULAR; INTRALESIONAL; INTRAMUSCULAR; INTRAVENOUS; SOFT TISSUE at 15:22

## 2018-10-24 RX ADMIN — PROPOFOL 160 MG: 10 INJECTION, EMULSION INTRAVENOUS at 15:22

## 2018-10-24 RX ADMIN — PROMETHAZINE HYDROCHLORIDE 25 MG: 25 INJECTION INTRAMUSCULAR; INTRAVENOUS at 16:52

## 2018-10-24 RX ADMIN — FENTANYL CITRATE 50 MCG: 50 INJECTION INTRAMUSCULAR; INTRAVENOUS at 16:47

## 2018-10-24 RX ADMIN — FENTANYL CITRATE 100 MCG: 50 INJECTION, SOLUTION INTRAMUSCULAR; INTRAVENOUS at 15:22

## 2018-10-24 RX ADMIN — HYDROMORPHONE HYDROCHLORIDE 1 MG: 1 INJECTION, SOLUTION INTRAMUSCULAR; INTRAVENOUS; SUBCUTANEOUS at 15:25

## 2018-10-24 RX ADMIN — FENTANYL CITRATE 50 MCG: 50 INJECTION INTRAMUSCULAR; INTRAVENOUS at 12:25

## 2018-10-24 RX ADMIN — SODIUM CHLORIDE, POTASSIUM CHLORIDE, SODIUM LACTATE AND CALCIUM CHLORIDE: 600; 310; 30; 20 INJECTION, SOLUTION INTRAVENOUS at 15:28

## 2018-10-24 RX ADMIN — ONDANSETRON 4 MG: 2 INJECTION INTRAMUSCULAR; INTRAVENOUS at 16:14

## 2018-10-24 RX ADMIN — FENTANYL CITRATE 100 MCG: 50 INJECTION, SOLUTION INTRAMUSCULAR; INTRAVENOUS at 16:10

## 2018-10-24 RX ADMIN — FENTANYL CITRATE 50 MCG: 50 INJECTION INTRAMUSCULAR; INTRAVENOUS at 16:58

## 2018-10-24 RX ADMIN — SODIUM CHLORIDE, POTASSIUM CHLORIDE, SODIUM LACTATE AND CALCIUM CHLORIDE: 600; 310; 30; 20 INJECTION, SOLUTION INTRAVENOUS at 14:18

## 2018-10-24 RX ADMIN — ONDANSETRON 4 MG: 2 INJECTION INTRAMUSCULAR; INTRAVENOUS at 14:29

## 2018-10-24 RX ADMIN — GLYCOPYRROLATE 0.6 MG: 0.2 INJECTION, SOLUTION INTRAMUSCULAR; INTRAVENOUS at 16:26

## 2018-10-24 RX ADMIN — GLYCOPYRROLATE 0.2 MG: 0.2 INJECTION, SOLUTION INTRAMUSCULAR; INTRAVENOUS at 15:22

## 2018-10-24 RX ADMIN — FENTANYL CITRATE 100 MCG: 50 INJECTION, SOLUTION INTRAMUSCULAR; INTRAVENOUS at 16:27

## 2018-10-24 RX ADMIN — FENTANYL CITRATE 50 MCG: 50 INJECTION INTRAMUSCULAR; INTRAVENOUS at 17:43

## 2018-10-24 RX ADMIN — HYDROMORPHONE HYDROCHLORIDE 1 MG: 1 INJECTION, SOLUTION INTRAMUSCULAR; INTRAVENOUS; SUBCUTANEOUS at 16:48

## 2018-10-24 RX ADMIN — ROCURONIUM BROMIDE 50 MG: 10 INJECTION INTRAVENOUS at 15:22

## 2018-10-24 RX ADMIN — DIAZEPAM 5 MG: 5 TABLET ORAL at 17:05

## 2018-10-24 RX ADMIN — EPHEDRINE SULFATE 25 MG: 50 INJECTION, SOLUTION INTRAVENOUS at 16:52

## 2018-10-24 RX ADMIN — FENTANYL CITRATE 50 MCG: 50 INJECTION INTRAMUSCULAR; INTRAVENOUS at 17:23

## 2018-10-24 RX ADMIN — Medication 0.2 MG: at 14:29

## 2018-10-24 RX ADMIN — MIDAZOLAM 2 MG: 1 INJECTION INTRAMUSCULAR; INTRAVENOUS at 15:11

## 2018-10-24 RX ADMIN — Medication 3 MG: at 16:26

## 2018-10-24 RX ADMIN — FENTANYL CITRATE 50 MCG: 50 INJECTION INTRAMUSCULAR; INTRAVENOUS at 17:09

## 2018-10-24 RX ADMIN — FENTANYL CITRATE 100 MCG: 50 INJECTION, SOLUTION INTRAMUSCULAR; INTRAVENOUS at 15:43

## 2018-10-24 RX ADMIN — CEFAZOLIN SODIUM 2 G: 2 INJECTION, SOLUTION INTRAVENOUS at 15:11

## 2018-10-24 RX ADMIN — KETOROLAC TROMETHAMINE 30 MG: 30 INJECTION, SOLUTION INTRAMUSCULAR at 16:13

## 2018-10-24 RX ADMIN — HYDROMORPHONE HYDROCHLORIDE 0.2 MG: 1 INJECTION, SOLUTION INTRAMUSCULAR; INTRAVENOUS; SUBCUTANEOUS at 13:48

## 2018-10-24 RX ADMIN — FENTANYL CITRATE 50 MCG: 50 INJECTION INTRAMUSCULAR; INTRAVENOUS at 12:42

## 2018-10-24 NOTE — IP AVS SNAPSHOT
MRN:[id number]                      After Visit Summary   10/24/2018    Sabina Herron    MRN: [id number]           Thank you!     Thank you for choosing [facility] for your care. Our goal is always to provide you with excellent care. Hearing back from our patients is one way we can continue to improve our services. Please take a few minutes to complete the written survey that you may receive in the mail after you visit. If you would like to speak to someone directly about your visit please contact Patient Relations at [phone]. Thank you!          Patient Information     Date Of Birth          1985        About your hospital stay     You were admitted on:  October 24, 2018 You last received care in the:  [facility]    You were discharged on:  October 24, 2018       Who to Call     For medical emergencies, please call 911.  For non-urgent questions about your medical care, please call your primary care provider or clinic, [phone]  For questions related to your surgery, please call your surgery clinic        Attending Provider     Provider Beba Johnson MD Surgery       Primary Care Provider Office Phone # Fax #    Ron Nicholas [phone]      Further instructions from your care team       HOME CARE FOLLOWING LAPAROSCOPIC CHOLECYSTECTOMY  DENIS Dumont, RAMEZ Gamboa, SERINA Javier    INCISIONAL CARE:  Replace the bandage over your incisions until all drainage stops, or if more comfortable to have in place.  If present, leave the steri-strips (white paper tapes) in place for 14 days after surgery.  If Dermabond (a type of skin glue) is present, leave in place until it wears/flakes off.     BATHING:  Avoid baths for 1 week after surgery.  Showers are okay.  You may wash your hair at any time.  Gently pat your incisions dry after bathing.    ACTIVITY:  Light Activity -- you  may immediately be up and about as tolerated.  Driving -- you may drive when comfortable and off narcotic pain medications.  Light Work -- resume when comfortable off pain medications.  (If you can drive, you probably can work.)  Strenuous Work/Activity -- limit lifting to 20 pounds for 2 weeks.  Progressively increase with time.  Active Sports (running, biking, etc.) -- cautiously resume after 2 weeks.    DISCOMFORT:  Use pain medications as prescribed by your surgeon.  Take the pain medication with some food, when possible, to minimize side effects.  Intermittent use of ice packs at the incision sites may help during the first 48 hours.  Expect gradual improvement.  You may experience shoulder pain, which is due to the air placed within your abdomen during the procedure.  This is temporary and usually passes within 2 days.    DIET:  Drink plenty of fluids.  While taking pain medications, increase dietary fiber or add a fiber supplementation like Metamucil or Citrucel to help prevent constipation - a possible side effect of pain medications.  It is not uncommon to experience some bowel changes (loose stools or constipation) after surgery.  Your body has to adapt to you no longer having a gall bladder.  To help minimize this side effect, avoid fatty foods for the first week after surgery.  You may then slowly increase the amount of fatty foods in your diet.      NAUSEA:  If nauseated from the anesthetic/pain meds; rest in bed, get up cautiously with assistance, and drink clear liquids (juice, tea, broth).    RETURN APPOINTMENT:  Schedule a follow-up visit 2-3 weeks post-op.  Office Phone:  [phone]     CONTACT US IF THE FOLLOWING DEVELOPS:   1. A fever that is above 101     2. If there is a large amount of drainage, bleeding, or swelling.   3. Severe pain that is not relieved by your prescription.   4. Drainage that is thick, cloudy, yellow, green or white.   5. Any other questions not answered by  Frequently  Asked Questions  sheet.      FREQUENTLY ASKED QUESTIONS:    Q:  How should my incision look?    A:  Normally your incision will appear slightly swollen with light redness directly along the incision itself as it heals.  It may feel like a bump or ridge as the healing/scarring happens, and over time (3-4 months) this bump or ridge feeling should slowly go away.  In general, clear or pink watery drainage can be normal at first as your incision heals, but should decrease over time.    Q:  How do I know if my incision is infected?  A:  Look at your incision for signs of infection, like redness around the incision spreading to surrounding skin, or drainage of cloudy or foul-smelling drainage.  If you feel warm, check your temperature to see if you are running a fever.    **If any of these things occur, please notify the nurse at our office.  We may need you to come into the office for an incision check.      Q:  How do I take care of my incision?  A:  If you have a dressing in place - Starting the day after surgery, replace the dressing 1-2 times a day until there is no further drainage from the incision.  At that time, a dressing is no longer needed.  Try to minimize tape on the skin if irritation is occurring at the tape sites.  If you have significant irritation from tape on the skin, please call the office to discuss other method of dressing your incision.    Small pieces of tape called  steri-strips  may be present directly overlying your incision; these may be removed 10 days after surgery unless otherwise specified by your surgeon.  If these tapes start to loosen at the ends, you may trim them back until they fall off or are removed.    A:  If you had  Dermabond  tissue glue used as a dressing (this causes your incision to look shiny with a clear covering over it) - This type of dressing wears off with time and does not require more dressings over the top unless it is draining around the glue as it wears off.  Do  not apply ointments or lotions over the incisions until the glue has completely worn off.    Q:  There is a piece of tape or a sticky  lead  still on my skin.  Can I remove this?  A:  Sometimes the sticky  leads  used for monitoring during surgery or for evaluation in the emergency department are not all removed while you are in the hospital.  These sometimes have a tab or metal dot on them.  You can easily remove these on your own, like taking off a band-aid.  If there is a gel substance under the  lead , simply wipe/clean it off with a washcloth or paper towel.      Q:  What can I do to minimize constipation (very hard stools, or lack of stools)?  A:  Stay well hydrated.  Increase your dietary fiber intake or take a fiber supplement -with plenty of water.  Walk around frequently.  You may consider an over-the-counter stool-softener.  Your Pharmacist can assist you with choosing one that is stocked at your pharmacy.  Constipation is also one of the most common side effects of pain medication.  If you are using pain medication, be pro-active and try to PREVENT problems with constipation by taking the steps above BEFORE constipation becomes a problem.    Q:  What do I do if I need more pain medications?  A:  Call the office to receive refills.  Be aware that certain pain meds cannot be called into a pharmacy and actually require a paper prescription.  A change may be made in your pain med as you progress thru your recovery period or if you have side effects to certain meds.    --Pain meds are NOT refilled after 5pm on weekdays, and NOT AT ALL on the weekends, so please look ahead to prevent problems.      Q:  Why am I having a hard time sleeping now that I am at home?  A:  Many medications you receive while you are in the hospital can impact your sleep for a number of days after your surgery/hospitalization.  Decreased level of activity and naps during the day may also make sleeping at night difficult.  Try to  minimize day-time naps, and get up frequently during the day to walk around your home during your recovery time.  Sleep aides may be of some help, but are not recommended for long-term use.      Q:  I am having some back discomfort.  What should I do?  A:  This may be related to certain positioning that was required for your surgery, extended periods of time in bed, or other changes in your overall activity level.  You may try ice, heat, acetaminophen, or ibuprofen to treat this temporarily.  Note that many pain medications have acetaminophen in them and would state this on the prescription bottle.  Be sure not to exceed the maximum of 4000mg per day of acetaminophen.     **If the pain you are having does not resolve, is severe, or is a flare of back pain you have had on other occasions prior to surgery, please contact your primary physician for further recommendations or for an appointment to be examined at their office.    Q:  Why am I having headaches?  A:  Headaches can be caused by many things:  caffeine withdrawal, use of pain meds, dehydration, high blood pressure, lack of sleep, over-activity/exhaustion, flare-up of usual migraine headaches.  If you feel this is related to muscle tension (a band-like feeling around the head, or a pressure at the low-back of the head) you may try ice or heat to this area.  You may need to drink more fluids (try electrolyte drink like Gatorade), rest, or take your usual migraine medications.   **If your headaches do not resolve, worsen, are accompanied by other symptoms, or if your blood pressure is high, please call your primary physician for recommendation and/or examination.    Q:  I am unable to urinate.  What do I do?  A:  A small percentage of people can have difficulty urinating initially after surgery.  This includes being able to urinate only a very small amount at a time and feeling discomfort or pressure in the very low abdomen.  This is called  urinary retention ,  and is actually an urgent situation.  Proceed to your nearest Emergency department for evaluation (not an Urgent Care Center).  Sometimes the bladder does not work correctly after certain medications you receive during surgery, or related to certain procedures.  You may need to have a catheter placed until your bladder recovers.  When planning to go to an Emergency department, it may help to call the ER to let them know you are coming in for this problem after a surgery.  This may help you get in quicker to be evaluated.  **If you have symptoms of a urinary tract infection, please contact your primary physician for the proper evaluation and treatment.          If you have other questions, please call the office Monday thru Friday between 8am and 5pm to discuss with the nurse or physician assistant.  #[phone]    There is a surgeon ON CALL on weekday evenings and over the weekend in case of urgent need only, and may be contacted at the same number.    If you are having an emergency, call 911 or proceed to your nearest emergency department.    GENERAL ANESTHESIA OR SEDATION ADULT DISCHARGE INSTRUCTIONS   SPECIAL PRECAUTIONS FOR 24 HOURS AFTER SURGERY    IT IS NOT UNUSUAL TO FEEL LIGHT-HEADED OR FAINT, UP TO 24 HOURS AFTER SURGERY OR WHILE TAKING PAIN MEDICATION.  IF YOU HAVE THESE SYMPTOMS; SIT FOR A FEW MINUTES BEFORE STANDING AND HAVE SOMEONE ASSIST YOU WHEN YOU GET UP TO WALK OR USE THE BATHROOM.    YOU SHOULD REST AND RELAX FOR THE NEXT 24 HOURS AND YOU MUST MAKE ARRANGEMENTS TO HAVE SOMEONE STAY WITH YOU FOR AT LEAST 24 HOURS AFTER YOUR DISCHARGE.  AVOID HAZARDOUS AND STRENUOUS ACTIVITIES.  DO NOT MAKE IMPORTANT DECISIONS FOR 24 HOURS.    DO NOT DRIVE ANY VEHICLE OR OPERATE MECHANICAL EQUIPMENT FOR 24 HOURS FOLLOWING THE END OF YOUR SURGERY.  EVEN THOUGH YOU MAY FEEL NORMAL, YOUR REACTIONS MAY BE AFFECTED BY THE MEDICATION YOU HAVE RECEIVED.    DO NOT DRINK ALCOHOLIC BEVERAGES FOR 24 HOURS FOLLOWING YOUR  "SURGERY.    DRINK CLEAR LIQUIDS (APPLE JUICE, GINGER ALE, 7-UP, BROTH, ETC.).  PROGRESS TO YOUR REGULAR DIET AS YOU FEEL ABLE.    YOU MAY HAVE A DRY MOUTH, A SORE THROAT, MUSCLES ACHES OR TROUBLE SLEEPING.  THESE SHOULD GO AWAY AFTER 24 HOURS.    CALL YOUR DOCTOR FOR ANY OF THE FOLLOWING:  SIGNS OF INFECTION (FEVER, GROWING TENDERNESS AT THE SURGERY SITE, A LARGE AMOUNT OF DRAINAGE OR BLEEDING, SEVERE PAIN, FOUL-SMELLING DRAINAGE, REDNESS OR SWELLING.    IT HAS BEEN OVER 8 TO 10 HOURS SINCE SURGERY AND YOU ARE STILL NOT ABLE TO URINATE (PASS WATER).      You received Toradol, an IV form of ibuprofen (Motrin) at 4:15 pm  Do not take any ibuprofen products until 10:15pm.    You had 2 oxycodone at 5:40pm        Pending Results     Date and Time Order Name Status Description    10/24/2018 1615 Surgical pathology exam In process             Admission Information     Date & Time Provider Department Dept. Phone    10/24/2018 Beba Coyne MD [facility] [phone]      Your Vitals Were     Blood Pressure Pulse Temperature Respirations Height Weight    154/88 91 100  F (37.8  C) 20 1.6 m (5' 3\") 91.6 kg (202 lb)    Last Period Pulse Oximetry BMI (Body Mass Index)             10/10/2018 95% 35.78 kg/m2         MyChart Information     [employer] lets you send messages to your doctor, view your test results, renew your prescriptions, schedule appointments and more. To sign up, go to [email or web address] . Click on \"Log in\" on the left side of the screen, which will take you to the Welcome page. Then click on \"Sign up Now\" on the right side of the page.     You will be asked to enter the access code listed below, as well as some personal information. Please follow the directions to create your username and password.     Your access code is: [email or web address]  Expires: 1/10/2019 11:20 AM     Your access code will  in 90 days. If you need help or a new code, please call your [facility] or " [phone].        Care EveryWhere ID     This is your Care EveryWhere ID. This could be used by other organizations to access your [facility] medical records  [id number]        Equal Access to Services     OPAL AKINS : Hadii aad ku hadluis miguelbrady Angelamarin, wamarcosda luqfoster, qaybta kavazquez qiu, jaguar vernin hayaaalla anandrobertosandeep hoover. So [facility] [phone].    ATENCIÓN: Si habla español, tiene a hogan disposición servicios gratuitos de asistencia lingüística. Llame al [phone].    We comply with applicable federal civil rights laws and [place] laws. We do not discriminate on the basis of race, color, national origin, age, disability, sex, sexual orientation, or gender identity.               Review of your medicines      START taking        Dose / Directions    oxyCODONE IR 5 MG tablet   Commonly known as:  ROXICODONE   Used for:  S/P cholecystectomy        Dose:  5-10 mg   Take 1-2 tablets (5-10 mg) by mouth every 4 hours as needed for moderate to severe pain   Quantity:  20 tablet   Refills:  0         CONTINUE these medicines which have NOT CHANGED        Dose / Directions    HYDROXYZINE HCL PO        Dose:  25 mg   Take 25 mg by mouth 4 times daily as needed for itching Take .5 to 2 tablets (12.5-5-mg total) four times a day as needed for allergies or anxiety   Refills:  0       medical cannabis (Patient's own supply.  Not a prescription)        Dose:  1 Dose   1 Dose See Admin Instructions (This is NOT a prescription, and does not certify that the patient has a qualifying medical condition for medical cannabis.  The purpose of this order is  to document that the patient reports taking medical cannabis.)   Refills:  0       TIZANIDINE HCL PO        Dose:  4 mg   Take 4 mg by mouth 6 times daily   Refills:  0       venlafaxine 37.5 MG Tb24 24 hr tablet   Commonly known as:  EFFEXOR-ER        Dose:  37.5 mg   Take 37.5 mg by mouth daily Right now taking 2 pills a day   Refills:  0            Where to get  your medicines      Some of these will need a paper prescription and others can be bought over the counter. Ask your nurse if you have questions.     Bring a paper prescription for each of these medications     oxyCODONE IR 5 MG tablet                Protect others around you: Learn how to safely use, store and throw away your medicines at www.disposemymeds.org.        Information about OPIOIDS     PRESCRIPTION OPIOIDS: WHAT YOU NEED TO KNOW   We gave you an opioid (narcotic) pain medicine. It is important to manage your pain, but opioids are not always the best choice. You should first try all the other options your care team gave you. Take this medicine for as short a time (and as few doses) as possible.    Some activities can increase your pain, such as bandage changes or therapy sessions. It may help to take your pain medicine 30 to 60 minutes before these activities. Reduce your stress by getting enough sleep, working on hobbies you enjoy and practicing relaxation or meditation. Talk to your care team about ways to manage your pain beyond prescription opioids.    These medicines have risks:    DO NOT drive when on new or higher doses of pain medicine. These medicines can affect your alertness and reaction times, and you could be arrested for driving under the influence (DUI). If you need to use opioids long-term, talk to your care team about driving.    DO NOT operate heavy machinery    DO NOT do any other dangerous activities while taking these medicines.    DO NOT drink any alcohol while taking these medicines.     If the opioid prescribed includes acetaminophen, DO NOT take with any other medicines that contain acetaminophen. Read all labels carefully. Look for the word  acetaminophen  or  Tylenol.  Ask your pharmacist if you have questions or are unsure.    You can get addicted to pain medicines, especially if you have a history of addiction (chemical, alcohol or substance dependence). Talk to your care  team about ways to reduce this risk.    All opioids tend to cause constipation. Drink plenty of water and eat foods that have a lot of fiber, such as fruits, vegetables, prune juice, apple juice and high-fiber cereal. Take a laxative (Miralax, milk of magnesia, Colace, Senna) if you don t move your bowels at least every other day. Other side effects include upset stomach, sleepiness, dizziness, throwing up, tolerance (needing more of the medicine to have the same effect), physical dependence and slowed breathing.    Store your pills in a secure place, locked if possible. We will not replace any lost or stolen medicine. If you don t finish your medicine, please throw away (dispose) as directed by your pharmacist. The [place] Pollution [employer] has more information about safe disposal: https://www.pca.[email or web address].us/living-green/managing-unwanted-medications             Medication List: This is a list of all your medications and when to take them. Check marks below indicate your daily home schedule. Keep this list as a reference.      Medications           Morning Afternoon Evening Bedtime As Needed    HYDROXYZINE HCL PO   Take 25 mg by mouth 4 times daily as needed for itching Take .5 to 2 tablets (12.5-5-mg total) four times a day as needed for allergies or anxiety                                medical cannabis (Patient's own supply.  Not a prescription)   1 Dose See Admin Instructions (This is NOT a prescription, and does not certify that the patient has a qualifying medical condition for medical cannabis.  The purpose of this order is  to document that the patient reports taking medical cannabis.)                                oxyCODONE IR 5 MG tablet   Commonly known as:  ROXICODONE   Take 1-2 tablets (5-10 mg) by mouth every 4 hours as needed for moderate to severe pain   Last time this was given:  10 mg on 10/24/2018  5:40 PM                                TIZANIDINE HCL PO   Take 4 mg by mouth 6 times  daily                                venlafaxine 37.5 MG Tb24 24 hr tablet   Commonly known as:  EFFEXOR-ER   Take 37.5 mg by mouth daily Right now taking 2 pills a day

## 2018-10-24 NOTE — ANESTHESIA CARE TRANSFER NOTE
Patient: Sabina Herron    Procedure(s):  LAPAROSCOPIC CHOLECYSTECTOMY    Diagnosis: Cholelithiosis  Diagnosis Additional Information: No value filed.    Anesthesia Type:   General, ETT     Note:  Airway :Face Mask  Patient transferred to:PACU  Handoff Report: Identifed the Patient, Identified the Reponsible Provider, Reviewed the pertinent medical history, Discussed the surgical course, Reviewed Intra-OP anesthesia mangement and issues during anesthesia, Set expectations for post-procedure period and Allowed opportunity for questions and acknowledgement of understanding      Vitals: (Last set prior to Anesthesia Care Transfer)    CRNA VITALS  10/24/2018 1607 - 10/24/2018 1643      10/24/2018             NIBP: (!)  166/121    NIBP Mean: 144                Electronically Signed By: THAD Irizarry CRNA  October 24, 2018  4:43 PM

## 2018-10-24 NOTE — ANESTHESIA POSTPROCEDURE EVALUATION
Patient: Sabina Herron    Procedure(s):  LAPAROSCOPIC CHOLECYSTECTOMY    Diagnosis:Cholelithiosis  Diagnosis Additional Information: chronic cholecystitis    Anesthesia Type:  General, ETT    Note:  Anesthesia Post Evaluation    Patient location during evaluation: PACU  Patient participation: Able to fully participate in evaluation  Level of consciousness: awake and alert  Pain management: adequate  Airway patency: patent  Cardiovascular status: acceptable  Respiratory status: acceptable  Hydration status: acceptable  PONV: controlled     Anesthetic complications: None          Last vitals:  Vitals:    10/24/18 1730 10/24/18 1740 10/24/18 1743   BP: 147/82 154/88    Pulse:      Resp: 16 20    Temp:  100  F (37.8  C)    SpO2: 96% 93% 95%         Electronically Signed By: Salo Guy MD  October 24, 2018  5:57 PM

## 2018-10-24 NOTE — IP AVS SNAPSHOT
[facility]    [address]    [facility] [address]    Phone:  [phone]    Fax:  [phone]                                       After Visit Summary   10/24/2018    Sabina Herron    MRN: [id number]           After Visit Summary Signature Page     I have received my discharge instructions, and my questions have been answered. I have discussed any challenges I see with this plan with the nurse or doctor.    ..........................................................................................................................................  Patient/Patient Representative Signature      ..........................................................................................................................................  Patient Representative Print Name and Relationship to Patient    ..................................................               ................................................  Date                                   Time    ..........................................................................................................................................  Reviewed by Signature/Title    ...................................................              ..............................................  Date                                               Time          22EPIC Rev 08/18

## 2018-10-24 NOTE — DISCHARGE INSTRUCTIONS
HOME CARE FOLLOWING LAPAROSCOPIC CHOLECYSTECTOMY  DENIS Dumont, RAMEZ Gamboa R. O Donnell, J. Shaheen    INCISIONAL CARE:  Replace the bandage over your incisions until all drainage stops, or if more comfortable to have in place.  If present, leave the steri-strips (white paper tapes) in place for 14 days after surgery.  If Dermabond (a type of skin glue) is present, leave in place until it wears/flakes off.     BATHING:  Avoid baths for 1 week after surgery.  Showers are okay.  You may wash your hair at any time.  Gently pat your incisions dry after bathing.    ACTIVITY:  Light Activity -- you may immediately be up and about as tolerated.  Driving -- you may drive when comfortable and off narcotic pain medications.  Light Work -- resume when comfortable off pain medications.  (If you can drive, you probably can work.)  Strenuous Work/Activity -- limit lifting to 20 pounds for 2 weeks.  Progressively increase with time.  Active Sports (running, biking, etc.) -- cautiously resume after 2 weeks.    DISCOMFORT:  Use pain medications as prescribed by your surgeon.  Take the pain medication with some food, when possible, to minimize side effects.  Intermittent use of ice packs at the incision sites may help during the first 48 hours.  Expect gradual improvement.  You may experience shoulder pain, which is due to the air placed within your abdomen during the procedure.  This is temporary and usually passes within 2 days.    DIET:  Drink plenty of fluids.  While taking pain medications, increase dietary fiber or add a fiber supplementation like Metamucil or Citrucel to help prevent constipation - a possible side effect of pain medications.  It is not uncommon to experience some bowel changes (loose stools or constipation) after surgery.  Your body has to adapt to you no longer having a gall bladder.  To help minimize this side effect, avoid fatty foods for the first week after surgery.  You may  then slowly increase the amount of fatty foods in your diet.      NAUSEA:  If nauseated from the anesthetic/pain meds; rest in bed, get up cautiously with assistance, and drink clear liquids (juice, tea, broth).    RETURN APPOINTMENT:  Schedule a follow-up visit 2-3 weeks post-op.  Office Phone:  [phone]     CONTACT US IF THE FOLLOWING DEVELOPS:   1. A fever that is above 101     2. If there is a large amount of drainage, bleeding, or swelling.   3. Severe pain that is not relieved by your prescription.   4. Drainage that is thick, cloudy, yellow, green or white.   5. Any other questions not answered by  Frequently Asked Questions  sheet.      FREQUENTLY ASKED QUESTIONS:    Q:  How should my incision look?    A:  Normally your incision will appear slightly swollen with light redness directly along the incision itself as it heals.  It may feel like a bump or ridge as the healing/scarring happens, and over time (3-4 months) this bump or ridge feeling should slowly go away.  In general, clear or pink watery drainage can be normal at first as your incision heals, but should decrease over time.    Q:  How do I know if my incision is infected?  A:  Look at your incision for signs of infection, like redness around the incision spreading to surrounding skin, or drainage of cloudy or foul-smelling drainage.  If you feel warm, check your temperature to see if you are running a fever.    **If any of these things occur, please notify the nurse at our office.  We may need you to come into the office for an incision check.      Q:  How do I take care of my incision?  A:  If you have a dressing in place - Starting the day after surgery, replace the dressing 1-2 times a day until there is no further drainage from the incision.  At that time, a dressing is no longer needed.  Try to minimize tape on the skin if irritation is occurring at the tape sites.  If you have significant irritation from tape on the skin, please call the  office to discuss other method of dressing your incision.    Small pieces of tape called  steri-strips  may be present directly overlying your incision; these may be removed 10 days after surgery unless otherwise specified by your surgeon.  If these tapes start to loosen at the ends, you may trim them back until they fall off or are removed.    A:  If you had  Dermabond  tissue glue used as a dressing (this causes your incision to look shiny with a clear covering over it) - This type of dressing wears off with time and does not require more dressings over the top unless it is draining around the glue as it wears off.  Do not apply ointments or lotions over the incisions until the glue has completely worn off.    Q:  There is a piece of tape or a sticky  lead  still on my skin.  Can I remove this?  A:  Sometimes the sticky  leads  used for monitoring during surgery or for evaluation in the emergency department are not all removed while you are in the hospital.  These sometimes have a tab or metal dot on them.  You can easily remove these on your own, like taking off a band-aid.  If there is a gel substance under the  lead , simply wipe/clean it off with a washcloth or paper towel.      Q:  What can I do to minimize constipation (very hard stools, or lack of stools)?  A:  Stay well hydrated.  Increase your dietary fiber intake or take a fiber supplement -with plenty of water.  Walk around frequently.  You may consider an over-the-counter stool-softener.  Your Pharmacist can assist you with choosing one that is stocked at your pharmacy.  Constipation is also one of the most common side effects of pain medication.  If you are using pain medication, be pro-active and try to PREVENT problems with constipation by taking the steps above BEFORE constipation becomes a problem.    Q:  What do I do if I need more pain medications?  A:  Call the office to receive refills.  Be aware that certain pain meds cannot be called into a  pharmacy and actually require a paper prescription.  A change may be made in your pain med as you progress thru your recovery period or if you have side effects to certain meds.    --Pain meds are NOT refilled after 5pm on weekdays, and NOT AT ALL on the weekends, so please look ahead to prevent problems.      Q:  Why am I having a hard time sleeping now that I am at home?  A:  Many medications you receive while you are in the hospital can impact your sleep for a number of days after your surgery/hospitalization.  Decreased level of activity and naps during the day may also make sleeping at night difficult.  Try to minimize day-time naps, and get up frequently during the day to walk around your home during your recovery time.  Sleep aides may be of some help, but are not recommended for long-term use.      Q:  I am having some back discomfort.  What should I do?  A:  This may be related to certain positioning that was required for your surgery, extended periods of time in bed, or other changes in your overall activity level.  You may try ice, heat, acetaminophen, or ibuprofen to treat this temporarily.  Note that many pain medications have acetaminophen in them and would state this on the prescription bottle.  Be sure not to exceed the maximum of 4000mg per day of acetaminophen.     **If the pain you are having does not resolve, is severe, or is a flare of back pain you have had on other occasions prior to surgery, please contact your primary physician for further recommendations or for an appointment to be examined at their office.    Q:  Why am I having headaches?  A:  Headaches can be caused by many things:  caffeine withdrawal, use of pain meds, dehydration, high blood pressure, lack of sleep, over-activity/exhaustion, flare-up of usual migraine headaches.  If you feel this is related to muscle tension (a band-like feeling around the head, or a pressure at the low-back of the head) you may try ice or heat to  this area.  You may need to drink more fluids (try electrolyte drink like Gatorade), rest, or take your usual migraine medications.   **If your headaches do not resolve, worsen, are accompanied by other symptoms, or if your blood pressure is high, please call your primary physician for recommendation and/or examination.    Q:  I am unable to urinate.  What do I do?  A:  A small percentage of people can have difficulty urinating initially after surgery.  This includes being able to urinate only a very small amount at a time and feeling discomfort or pressure in the very low abdomen.  This is called  urinary retention , and is actually an urgent situation.  Proceed to your nearest Emergency department for evaluation (not an Urgent Care Center).  Sometimes the bladder does not work correctly after certain medications you receive during surgery, or related to certain procedures.  You may need to have a catheter placed until your bladder recovers.  When planning to go to an Emergency department, it may help to call the ER to let them know you are coming in for this problem after a surgery.  This may help you get in quicker to be evaluated.  **If you have symptoms of a urinary tract infection, please contact your primary physician for the proper evaluation and treatment.          If you have other questions, please call the office Monday thru Friday between 8am and 5pm to discuss with the nurse or physician assistant.  #[phone]    There is a surgeon ON CALL on weekday evenings and over the weekend in case of urgent need only, and may be contacted at the same number.    If you are having an emergency, call 911 or proceed to your nearest emergency department.    GENERAL ANESTHESIA OR SEDATION ADULT DISCHARGE INSTRUCTIONS   SPECIAL PRECAUTIONS FOR 24 HOURS AFTER SURGERY    IT IS NOT UNUSUAL TO FEEL LIGHT-HEADED OR FAINT, UP TO 24 HOURS AFTER SURGERY OR WHILE TAKING PAIN MEDICATION.  IF YOU HAVE THESE SYMPTOMS; SIT FOR  A FEW MINUTES BEFORE STANDING AND HAVE SOMEONE ASSIST YOU WHEN YOU GET UP TO WALK OR USE THE BATHROOM.    YOU SHOULD REST AND RELAX FOR THE NEXT 24 HOURS AND YOU MUST MAKE ARRANGEMENTS TO HAVE SOMEONE STAY WITH YOU FOR AT LEAST 24 HOURS AFTER YOUR DISCHARGE.  AVOID HAZARDOUS AND STRENUOUS ACTIVITIES.  DO NOT MAKE IMPORTANT DECISIONS FOR 24 HOURS.    DO NOT DRIVE ANY VEHICLE OR OPERATE MECHANICAL EQUIPMENT FOR 24 HOURS FOLLOWING THE END OF YOUR SURGERY.  EVEN THOUGH YOU MAY FEEL NORMAL, YOUR REACTIONS MAY BE AFFECTED BY THE MEDICATION YOU HAVE RECEIVED.    DO NOT DRINK ALCOHOLIC BEVERAGES FOR 24 HOURS FOLLOWING YOUR SURGERY.    DRINK CLEAR LIQUIDS (APPLE JUICE, GINGER ALE, 7-UP, BROTH, ETC.).  PROGRESS TO YOUR REGULAR DIET AS YOU FEEL ABLE.    YOU MAY HAVE A DRY MOUTH, A SORE THROAT, MUSCLES ACHES OR TROUBLE SLEEPING.  THESE SHOULD GO AWAY AFTER 24 HOURS.    CALL YOUR DOCTOR FOR ANY OF THE FOLLOWING:  SIGNS OF INFECTION (FEVER, GROWING TENDERNESS AT THE SURGERY SITE, A LARGE AMOUNT OF DRAINAGE OR BLEEDING, SEVERE PAIN, FOUL-SMELLING DRAINAGE, REDNESS OR SWELLING.    IT HAS BEEN OVER 8 TO 10 HOURS SINCE SURGERY AND YOU ARE STILL NOT ABLE TO URINATE (PASS WATER).      You received Toradol, an IV form of ibuprofen (Motrin) at 4:15 pm  Do not take any ibuprofen products until 10:15pm.    You had 2 oxycodone at 5:40pm

## 2018-10-24 NOTE — ANESTHESIA PREPROCEDURE EVALUATION
PAC NOTE:       ANESTHESIA PRE EVALUATION:  Anesthesia Evaluation     . Pt has had prior anesthetic.            ROS/MED HX    ENT/Pulmonary:  - neg pulmonary ROS     Neurologic:     (+)neuropathy     Cardiovascular:     (+) ----. : . . fainting (syncope). :. .       METS/Exercise Tolerance:     Hematologic:  - neg hematologic  ROS       Musculoskeletal:   (+) , , other musculoskeletal- sadia-Danlos      GI/Hepatic:  - neg GI/hepatic ROS       Renal/Genitourinary:  - ROS Renal section negative       Endo:  - neg endo ROS       Psychiatric:     (+) psychiatric history anxiety and depression      Infectious Disease:         Malignancy:         Other:    (+) H/O Chronic Pain,                   Physical Exam  Normal systems: cardiovascular and pulmonary    Airway   Mallampati: II  TM distance: >3 FB  Neck ROM: full    Dental     Cardiovascular       Pulmonary              Anesthesia Plan      History & Physical Review  History and physical reviewed and following examination; no interval change.    ASA Status:  2 .    NPO Status:  > 8 hours    Plan for General and ETT with Intravenous and Propofol induction. Maintenance will be Balanced.    PONV prophylaxis:  Ondansetron (or other 5HT-3) and Dexamethasone or Solumedrol       Postoperative Care  Postoperative pain management:  IV analgesics.      Consents  Anesthetic plan, risks, benefits and alternatives discussed with:  Patient.  Use of blood products discussed: Yes.   Use of blood products discussed with Patient.  Consented to blood products.  .                            .

## 2018-10-24 NOTE — OP NOTE
[facility] General Surgery Operative Note    Pre-operative diagnosis: symptomatic gallstones   Post-operative diagnosis: chronic cholecystitis   Procedure: laparoscopic cholecystectomy   Surgeon: Beba Coyne MD   Assistant(s): Hilda Mckenna PA-C  The Physician Assistant was medically necessary for their expertise in prepping, camera management, suctioning, suturing and retraction.   Anesthesia: general   Estimated blood loss:  Specimen: 20 cc  gallbladder and contents               DESCRIPTION OF PROCEDURE:  The patient was taken to the operating room and placed on the table in supine position.  General endotracheal anesthesia was induced and the abdomen was prepped and draped in standard sterile fashion.  A curvilineal incision below the umbilicus was made with a blade.  The incision was carried down to the fascia. The fascia was elevated with kocher clamps and the fascia was incised in the midline with a blade.  The peritoneum was entered sharply.  A figure of eight 0 Vicryl suture was placed in the fascia.  The Andrea trocar was introduced and the abdomen was insufflated with CO2.  There were omental adhesion to the midline superior to the umbilicus which were left alone. The patient was placed in reverse Trendelenburg and right side up.  A 5 mm trocar was placed in the right-subxiphoid position.  A 5 mm trocar was placed in the right upper quadrant, just below the costal margin at the midclavicular line.  Another was placed at the anterior axillary line just below the costal margin on the right.  The gallbladder appeared tensely distended but the wall didn't appear thickened.  A large needle was used to remove bilious fluid from the gallbladder to decompress it. Omental adhesions to the body and infundibulum were taken down with cautery. The fundus of the gallbladder was grasped and retracted cephalad.  The infundibulum was grasped and retracted laterally.  The peritoneum over the medial and  lateral aspects of the triangle of Calot was taken down with the [place] dissector and modest amounts of Bovie electrocautery.  The cystic duct and artery were freed up from surrounding tissues.  The triangle of Calot was skeletonized revealing the critical view of safety.  This revealed no additional ducts or vessels.  The cystic artery and duct were each clipped twice proximally, once distally and transected with the scissors.  The gallbladder was then removed from the liver using the hook electrocautery. There was a small amount of bile spillage. The gallbladder was passed into an Endocatch bag and removed through the umbilical trocar site.  We observed the right upper quadrant carefully for hemostasis.  Hemostasis was assured and cautery was used on the liver bed.  The abdomen was irrigated with saline. The trocar sites were anesthetized with local anesthetic.  Each of the trocars was removed under direct visualization.  There was no bleeding from any of these sites.  The Andrea trocar was removed and the abdomen was evacuated of CO2. The previously placed 0 vicryl suture in the fascia was tied emilee.  The skin of the umbilical incision was anesthetized with local anesthetic.  All of the incisions were closed with interrupted 4-0 Vicryl subcuticular sutures and Steri-Strips.  The patient tolerated the procedure well.  Sponge and instrument counts were correct.      FINDINGS: distended gallbladder without overtly thickened wall    Beba Coyne MD

## 2018-10-26 LAB — COPATH REPORT: NORMAL

## 2018-10-29 ENCOUNTER — TELEPHONE (OUTPATIENT)
Dept: SURGERY | Facility: CLINIC | Age: 33
End: 2018-10-29

## 2018-10-29 DIAGNOSIS — Z90.49 S/P CHOLECYSTECTOMY: ICD-10-CM

## 2018-10-29 RX ORDER — OXYCODONE HYDROCHLORIDE 5 MG/1
5-10 TABLET ORAL EVERY 4 HOURS PRN
Qty: 20 TABLET | Refills: 0 | Status: ON HOLD | OUTPATIENT
Start: 2018-10-29 | End: 2018-11-04

## 2018-10-29 NOTE — TELEPHONE ENCOUNTER
Surgery Type/Date:  S/p lap mariella 10/24/18, Dr. Coyne  Patient medication request:  Oxycodone 5mg  Refill request: first  Type/Amount of pain medication in current use:  Tylenol / minimal ibuprofen.    Patient Symptoms:  Pt c/o sharp incisional pain, worsened with certain movements.  Ran out of pain medication over the weekend and has been using mostly Tylenol.  States she is limited on taking much ibuprofen because she has hx of gastric bypass.    Discussed using Tylenol in between doses and the use of cold packs at incision sites.  Pt denies any redness, swelling, drainage from incisions and fever/chills.    She is aware that one refill via telephone and if further need pt to be seen in clinic.  She will call for post-op appt.     Kaelyn Saba RN

## 2018-10-29 NOTE — TELEPHONE ENCOUNTER
Name of caller: Patient    Reason for Call:  Severe pain, needs refill of pain meds     Surgeon:  Dr. Coyne     Recent Surgery:  Yes.    If yes, when & what type:  Laparoscopic cholecystectomy on 10/24/18       Best phone number to reach pt at is: [phone]  Ok to leave a message with medical info? Yes.    Pharmacy preferred (if calling for a refill): NA

## 2018-11-04 ENCOUNTER — APPOINTMENT (OUTPATIENT)
Dept: NUCLEAR MEDICINE | Facility: CLINIC | Age: 33
DRG: 920 | End: 2018-11-04
Attending: SURGERY
Payer: MEDICARE

## 2018-11-04 ENCOUNTER — HOSPITAL ENCOUNTER (INPATIENT)
Facility: CLINIC | Age: 33
LOS: 1 days | Discharge: HOME | DRG: 920 | End: 2018-11-05
Attending: INTERNAL MEDICINE | Admitting: INTERNAL MEDICINE
Payer: MEDICARE

## 2018-11-04 ENCOUNTER — TRANSFERRED RECORDS (OUTPATIENT)
Dept: HEALTH INFORMATION MANAGEMENT | Facility: CLINIC | Age: 33
End: 2018-11-04

## 2018-11-04 DIAGNOSIS — K91.872 SEROMA OF DIGESTIVE SYSTEM AFTER DIGESTIVE SYSTEM PROCEDURE: Primary | ICD-10-CM

## 2018-11-04 DIAGNOSIS — R52 PAIN: ICD-10-CM

## 2018-11-04 PROBLEM — R11.2 NAUSEA & VOMITING: Status: ACTIVE | Noted: 2018-11-04

## 2018-11-04 LAB
ALBUMIN SERPL-MCNC: 3.7 G/DL (ref 3.4–5)
ALBUMIN UR-MCNC: 30 MG/DL
ALP SERPL-CCNC: 338 U/L (ref 40–150)
ALT SERPL W P-5'-P-CCNC: 45 U/L (ref 0–50)
ANION GAP SERPL CALCULATED.3IONS-SCNC: 15 MMOL/L (ref 3–14)
APPEARANCE UR: CLEAR
AST SERPL W P-5'-P-CCNC: 49 U/L (ref 0–45)
BASOPHILS # BLD AUTO: 0.1 10E9/L (ref 0–0.2)
BASOPHILS NFR BLD AUTO: 0.5 %
BILIRUB SERPL-MCNC: 0.3 MG/DL (ref 0.2–1.3)
BILIRUB UR QL STRIP: NEGATIVE
BUN SERPL-MCNC: 6 MG/DL (ref 7–30)
CALCIUM SERPL-MCNC: 8.7 MG/DL (ref 8.5–10.1)
CHLORIDE SERPL-SCNC: 107 MMOL/L (ref 94–109)
CO2 SERPL-SCNC: 16 MMOL/L (ref 20–32)
COLOR UR AUTO: ABNORMAL
CREAT SERPL-MCNC: 0.43 MG/DL (ref 0.52–1.04)
DIFFERENTIAL METHOD BLD: ABNORMAL
EOSINOPHIL # BLD AUTO: 0 10E9/L (ref 0–0.7)
EOSINOPHIL NFR BLD AUTO: 0 %
ERYTHROCYTE [DISTWIDTH] IN BLOOD BY AUTOMATED COUNT: 21.6 % (ref 10–15)
GFR SERPL CREATININE-BSD FRML MDRD: >90 ML/MIN/1.7M2
GLUCOSE SERPL-MCNC: 100 MG/DL (ref 70–99)
GLUCOSE UR STRIP-MCNC: NEGATIVE MG/DL
HCG UR QL: NEGATIVE
HCT VFR BLD AUTO: 38.5 % (ref 35–47)
HGB BLD-MCNC: 11.9 G/DL (ref 11.7–15.7)
HGB UR QL STRIP: NEGATIVE
IMM GRANULOCYTES # BLD: 0.6 10E9/L (ref 0–0.4)
IMM GRANULOCYTES NFR BLD: 3.2 %
KETONES UR STRIP-MCNC: 80 MG/DL
LACTATE BLD-SCNC: 0.8 MMOL/L (ref 0.7–2)
LEUKOCYTE ESTERASE UR QL STRIP: NEGATIVE
LYMPHOCYTES # BLD AUTO: 1.6 10E9/L (ref 0.8–5.3)
LYMPHOCYTES NFR BLD AUTO: 8.1 %
MCH RBC QN AUTO: 25.5 PG (ref 26.5–33)
MCHC RBC AUTO-ENTMCNC: 30.9 G/DL (ref 31.5–36.5)
MCV RBC AUTO: 83 FL (ref 78–100)
MONOCYTES # BLD AUTO: 0.9 10E9/L (ref 0–1.3)
MONOCYTES NFR BLD AUTO: 4.6 %
MUCOUS THREADS #/AREA URNS LPF: PRESENT /LPF
NEUTROPHILS # BLD AUTO: 16.2 10E9/L (ref 1.6–8.3)
NEUTROPHILS NFR BLD AUTO: 83.6 %
NITRATE UR QL: NEGATIVE
NRBC # BLD AUTO: 0 10*3/UL
NRBC BLD AUTO-RTO: 0 /100
PH UR STRIP: 5 PH (ref 5–7)
PLATELET # BLD AUTO: 848 10E9/L (ref 150–450)
POTASSIUM SERPL-SCNC: 4.3 MMOL/L (ref 3.4–5.3)
PROT SERPL-MCNC: 7.9 G/DL (ref 6.8–8.8)
RBC # BLD AUTO: 4.66 10E12/L (ref 3.8–5.2)
RBC #/AREA URNS AUTO: <1 /HPF (ref 0–2)
SODIUM SERPL-SCNC: 138 MMOL/L (ref 133–144)
SOURCE: ABNORMAL
SP GR UR STRIP: 1.02 (ref 1–1.03)
UROBILINOGEN UR STRIP-MCNC: 0 MG/DL (ref 0–2)
WBC # BLD AUTO: 19.3 10E9/L (ref 4–11)
WBC #/AREA URNS AUTO: 2 /HPF (ref 0–5)

## 2018-11-04 PROCEDURE — 80053 COMPREHEN METABOLIC PANEL: CPT | Performed by: INTERNAL MEDICINE

## 2018-11-04 PROCEDURE — 34300033 ZZH RX 343: Performed by: SURGERY

## 2018-11-04 PROCEDURE — 85025 COMPLETE CBC W/AUTO DIFF WBC: CPT | Performed by: INTERNAL MEDICINE

## 2018-11-04 PROCEDURE — 25000132 ZZH RX MED GY IP 250 OP 250 PS 637: Mod: GY | Performed by: INTERNAL MEDICINE

## 2018-11-04 PROCEDURE — A9270 NON-COVERED ITEM OR SERVICE: HCPCS | Mod: GY | Performed by: INTERNAL MEDICINE

## 2018-11-04 PROCEDURE — 25000125 ZZHC RX 250: Performed by: INTERNAL MEDICINE

## 2018-11-04 PROCEDURE — 81001 URINALYSIS AUTO W/SCOPE: CPT | Performed by: INTERNAL MEDICINE

## 2018-11-04 PROCEDURE — 25000128 H RX IP 250 OP 636: Performed by: INTERNAL MEDICINE

## 2018-11-04 PROCEDURE — 81025 URINE PREGNANCY TEST: CPT | Performed by: INTERNAL MEDICINE

## 2018-11-04 PROCEDURE — 36415 COLL VENOUS BLD VENIPUNCTURE: CPT | Performed by: INTERNAL MEDICINE

## 2018-11-04 PROCEDURE — 25000128 H RX IP 250 OP 636: Performed by: SURGERY

## 2018-11-04 PROCEDURE — A9537 TC99M MEBROFENIN: HCPCS | Performed by: SURGERY

## 2018-11-04 PROCEDURE — 12000007 ZZH R&B INTERMEDIATE

## 2018-11-04 PROCEDURE — 78226 HEPATOBILIARY SYSTEM IMAGING: CPT

## 2018-11-04 PROCEDURE — 99221 1ST HOSP IP/OBS SF/LOW 40: CPT | Mod: 54 | Performed by: SURGERY

## 2018-11-04 PROCEDURE — 83605 ASSAY OF LACTIC ACID: CPT | Performed by: INTERNAL MEDICINE

## 2018-11-04 PROCEDURE — 99222 1ST HOSP IP/OBS MODERATE 55: CPT | Mod: AI | Performed by: INTERNAL MEDICINE

## 2018-11-04 RX ORDER — PIPERACILLIN SODIUM, TAZOBACTAM SODIUM 3; .375 G/15ML; G/15ML
3.38 INJECTION, POWDER, LYOPHILIZED, FOR SOLUTION INTRAVENOUS EVERY 6 HOURS
Status: DISCONTINUED | OUTPATIENT
Start: 2018-11-04 | End: 2018-11-04

## 2018-11-04 RX ORDER — HYDROXYZINE HYDROCHLORIDE 10 MG/1
5 TABLET, FILM COATED ORAL EVERY 6 HOURS PRN
Status: DISCONTINUED | OUTPATIENT
Start: 2018-11-04 | End: 2018-11-05 | Stop reason: HOSPADM

## 2018-11-04 RX ORDER — POTASSIUM CHLORIDE 29.8 MG/ML
20 INJECTION INTRAVENOUS
Status: DISCONTINUED | OUTPATIENT
Start: 2018-11-04 | End: 2018-11-05 | Stop reason: HOSPADM

## 2018-11-04 RX ORDER — POTASSIUM CHLORIDE 1500 MG/1
20-40 TABLET, EXTENDED RELEASE ORAL
Status: DISCONTINUED | OUTPATIENT
Start: 2018-11-04 | End: 2018-11-05 | Stop reason: HOSPADM

## 2018-11-04 RX ORDER — ONDANSETRON 2 MG/ML
4 INJECTION INTRAMUSCULAR; INTRAVENOUS EVERY 6 HOURS PRN
Status: DISCONTINUED | OUTPATIENT
Start: 2018-11-04 | End: 2018-11-05 | Stop reason: HOSPADM

## 2018-11-04 RX ORDER — NALOXONE HYDROCHLORIDE 0.4 MG/ML
.1-.4 INJECTION, SOLUTION INTRAMUSCULAR; INTRAVENOUS; SUBCUTANEOUS
Status: DISCONTINUED | OUTPATIENT
Start: 2018-11-04 | End: 2018-11-05 | Stop reason: HOSPADM

## 2018-11-04 RX ORDER — ONDANSETRON 4 MG/1
4 TABLET, ORALLY DISINTEGRATING ORAL EVERY 8 HOURS PRN
COMMUNITY

## 2018-11-04 RX ORDER — HYDROMORPHONE HYDROCHLORIDE 1 MG/ML
.3-.5 INJECTION, SOLUTION INTRAMUSCULAR; INTRAVENOUS; SUBCUTANEOUS
Status: DISCONTINUED | OUTPATIENT
Start: 2018-11-04 | End: 2018-11-05 | Stop reason: HOSPADM

## 2018-11-04 RX ORDER — PROCHLORPERAZINE 25 MG
25 SUPPOSITORY, RECTAL RECTAL EVERY 12 HOURS PRN
Status: DISCONTINUED | OUTPATIENT
Start: 2018-11-04 | End: 2018-11-05 | Stop reason: HOSPADM

## 2018-11-04 RX ORDER — POTASSIUM CL/LIDO/0.9 % NACL 10MEQ/0.1L
10 INTRAVENOUS SOLUTION, PIGGYBACK (ML) INTRAVENOUS
Status: DISCONTINUED | OUTPATIENT
Start: 2018-11-04 | End: 2018-11-05 | Stop reason: HOSPADM

## 2018-11-04 RX ORDER — SODIUM CHLORIDE 9 MG/ML
INJECTION, SOLUTION INTRAVENOUS CONTINUOUS
Status: DISCONTINUED | OUTPATIENT
Start: 2018-11-04 | End: 2018-11-05 | Stop reason: HOSPADM

## 2018-11-04 RX ORDER — ONDANSETRON 4 MG/1
4 TABLET, ORALLY DISINTEGRATING ORAL EVERY 6 HOURS PRN
Status: DISCONTINUED | OUTPATIENT
Start: 2018-11-04 | End: 2018-11-05 | Stop reason: HOSPADM

## 2018-11-04 RX ORDER — POTASSIUM CHLORIDE 1.5 G/1.58G
20-40 POWDER, FOR SOLUTION ORAL
Status: DISCONTINUED | OUTPATIENT
Start: 2018-11-04 | End: 2018-11-05 | Stop reason: HOSPADM

## 2018-11-04 RX ORDER — POTASSIUM CHLORIDE 7.45 MG/ML
10 INJECTION INTRAVENOUS
Status: DISCONTINUED | OUTPATIENT
Start: 2018-11-04 | End: 2018-11-05 | Stop reason: HOSPADM

## 2018-11-04 RX ORDER — ACETAMINOPHEN 325 MG/1
650 TABLET ORAL EVERY 4 HOURS PRN
Status: DISCONTINUED | OUTPATIENT
Start: 2018-11-04 | End: 2018-11-05

## 2018-11-04 RX ORDER — VENLAFAXINE HYDROCHLORIDE 37.5 MG/1
112.5 CAPSULE, EXTENDED RELEASE ORAL DAILY
COMMUNITY

## 2018-11-04 RX ORDER — PROCHLORPERAZINE MALEATE 5 MG
10 TABLET ORAL EVERY 6 HOURS PRN
Status: DISCONTINUED | OUTPATIENT
Start: 2018-11-04 | End: 2018-11-05 | Stop reason: HOSPADM

## 2018-11-04 RX ORDER — TEMAZEPAM 15 MG/1
15 CAPSULE ORAL
Status: DISCONTINUED | OUTPATIENT
Start: 2018-11-04 | End: 2018-11-05 | Stop reason: HOSPADM

## 2018-11-04 RX ORDER — HYDROXYZINE HYDROCHLORIDE 25 MG/1
25-50 TABLET, FILM COATED ORAL 3 TIMES DAILY PRN
COMMUNITY

## 2018-11-04 RX ORDER — PROCHLORPERAZINE MALEATE 10 MG
10 TABLET ORAL EVERY 6 HOURS PRN
COMMUNITY

## 2018-11-04 RX ORDER — DESOGESTREL AND ETHINYL ESTRADIOL 0.15-0.03
1 KIT ORAL DAILY
COMMUNITY

## 2018-11-04 RX ORDER — ACETAMINOPHEN 325 MG/1
650 TABLET ORAL EVERY 6 HOURS PRN
COMMUNITY

## 2018-11-04 RX ORDER — KIT FOR THE PREPARATION OF TECHNETIUM TC 99M MEBROFENIN 45 MG/10ML
6 INJECTION, POWDER, LYOPHILIZED, FOR SOLUTION INTRAVENOUS ONCE
Status: COMPLETED | OUTPATIENT
Start: 2018-11-04 | End: 2018-11-04

## 2018-11-04 RX ORDER — OXYCODONE HYDROCHLORIDE 5 MG/1
5-10 TABLET ORAL EVERY 4 HOURS PRN
Status: DISCONTINUED | OUTPATIENT
Start: 2018-11-04 | End: 2018-11-05 | Stop reason: HOSPADM

## 2018-11-04 RX ORDER — HYDROMORPHONE HYDROCHLORIDE 1 MG/ML
0.2 INJECTION, SOLUTION INTRAMUSCULAR; INTRAVENOUS; SUBCUTANEOUS
Status: DISCONTINUED | OUTPATIENT
Start: 2018-11-04 | End: 2018-11-04

## 2018-11-04 RX ORDER — IBUPROFEN 600 MG/1
600 TABLET, FILM COATED ORAL EVERY 6 HOURS PRN
Status: DISCONTINUED | OUTPATIENT
Start: 2018-11-04 | End: 2018-11-05

## 2018-11-04 RX ADMIN — ONDANSETRON 4 MG: 2 INJECTION INTRAMUSCULAR; INTRAVENOUS at 04:34

## 2018-11-04 RX ADMIN — TIZANIDINE 4 MG: 4 TABLET ORAL at 20:37

## 2018-11-04 RX ADMIN — HYDROMORPHONE HYDROCHLORIDE 0.5 MG: 1 INJECTION, SOLUTION INTRAMUSCULAR; INTRAVENOUS; SUBCUTANEOUS at 08:46

## 2018-11-04 RX ADMIN — OXYCODONE HYDROCHLORIDE 10 MG: 5 TABLET ORAL at 12:01

## 2018-11-04 RX ADMIN — TAZOBACTAM SODIUM AND PIPERACILLIN SODIUM 3.38 G: 375; 3 INJECTION, SOLUTION INTRAVENOUS at 14:14

## 2018-11-04 RX ADMIN — ONDANSETRON 4 MG: 2 INJECTION INTRAMUSCULAR; INTRAVENOUS at 12:56

## 2018-11-04 RX ADMIN — PROCHLORPERAZINE EDISYLATE 10 MG: 5 INJECTION INTRAMUSCULAR; INTRAVENOUS at 05:32

## 2018-11-04 RX ADMIN — SODIUM CHLORIDE, PRESERVATIVE FREE: 5 INJECTION INTRAVENOUS at 16:29

## 2018-11-04 RX ADMIN — HYDROMORPHONE HYDROCHLORIDE 0.5 MG: 1 INJECTION, SOLUTION INTRAMUSCULAR; INTRAVENOUS; SUBCUTANEOUS at 16:49

## 2018-11-04 RX ADMIN — TIZANIDINE 4 MG: 4 TABLET ORAL at 10:18

## 2018-11-04 RX ADMIN — HYDROMORPHONE HYDROCHLORIDE 0.5 MG: 1 INJECTION, SOLUTION INTRAMUSCULAR; INTRAVENOUS; SUBCUTANEOUS at 06:32

## 2018-11-04 RX ADMIN — OXYCODONE HYDROCHLORIDE 5 MG: 5 TABLET ORAL at 07:38

## 2018-11-04 RX ADMIN — Medication 0.2 MG: at 04:33

## 2018-11-04 RX ADMIN — TEMAZEPAM 15 MG: 15 CAPSULE ORAL at 20:47

## 2018-11-04 RX ADMIN — FAMOTIDINE 20 MG: 10 INJECTION, SOLUTION INTRAVENOUS at 04:34

## 2018-11-04 RX ADMIN — TIZANIDINE 4 MG: 4 TABLET ORAL at 06:32

## 2018-11-04 RX ADMIN — TAZOBACTAM SODIUM AND PIPERACILLIN SODIUM 3.38 G: 375; 3 INJECTION, SOLUTION INTRAVENOUS at 20:37

## 2018-11-04 RX ADMIN — TIZANIDINE 4 MG: 4 TABLET ORAL at 12:56

## 2018-11-04 RX ADMIN — SODIUM CHLORIDE, PRESERVATIVE FREE: 5 INJECTION INTRAVENOUS at 04:34

## 2018-11-04 RX ADMIN — HYDROMORPHONE HYDROCHLORIDE 0.5 MG: 1 INJECTION, SOLUTION INTRAMUSCULAR; INTRAVENOUS; SUBCUTANEOUS at 20:36

## 2018-11-04 RX ADMIN — FAMOTIDINE 20 MG: 10 INJECTION, SOLUTION INTRAVENOUS at 16:31

## 2018-11-04 RX ADMIN — Medication 5 MG: at 09:20

## 2018-11-04 RX ADMIN — HYDROMORPHONE HYDROCHLORIDE 0.5 MG: 1 INJECTION, SOLUTION INTRAMUSCULAR; INTRAVENOUS; SUBCUTANEOUS at 12:56

## 2018-11-04 RX ADMIN — TIZANIDINE 4 MG: 4 TABLET ORAL at 16:31

## 2018-11-04 RX ADMIN — MEBROFENIN 6.4 MILLICURIE: 45 INJECTION, POWDER, LYOPHILIZED, FOR SOLUTION INTRAVENOUS at 10:35

## 2018-11-04 ASSESSMENT — ACTIVITIES OF DAILY LIVING (ADL)
ADLS_ACUITY_SCORE: 11

## 2018-11-04 ASSESSMENT — PAIN DESCRIPTION - DESCRIPTORS
DESCRIPTORS: DISCOMFORT

## 2018-11-04 NOTE — PROGRESS NOTES
Patient was admitted by my colleague earlier this morning.  She is postop after cholecystectomy on 10/24/18.  She went to urgent care with abdominal pain, nausea, and dry heaves.  Workup at urgent care included CT scan which suggested probable seroma (fluid in the gallbladder fossa).  She was referred here for further evaluation.  Emergency department evaluation showed leukocytosis with white blood cell count of 19.  She was admitted for further evaluation and treatment.  There was concern about possible bile leak.  Surgery was consulted.  HIDA scan was obtained and showed no evidence of bile leak.  Zosyn was started, empirically.  Patient has had some tachycardia.  I discussed this patient with Dr. Dorsey of surgery.  Plan is to continue Zosyn, IV fluid hydration, and clinical monitoring.  Repeat CBC tomorrow.

## 2018-11-04 NOTE — IP AVS SNAPSHOT
MRN:[id number]                      After Visit Summary   11/4/2018    Sabina Herron    MRN: [id number]           Thank you!     Thank you for choosing [facility] for your care. Our goal is always to provide you with excellent care. Hearing back from our patients is one way we can continue to improve our services. Please take a few minutes to complete the written survey that you may receive in the mail after you visit. If you would like to speak to someone directly about your visit please contact Patient Relations at [phone]. Thank you!          Patient Information     Date Of Birth          1985        Designated Caregiver       Most Recent Value    Caregiver    Will someone help with your care after discharge? yes    Name of designated caregiver Jason Bravo    Phone number of caregiver [phone]    Caregiver address same as pt      About your hospital stay     You were admitted on:  November 4, 2018 You last received care in the:  [facility]    You were discharged on:  November 5, 2018        Reason for your hospital stay       Abdominal pain, nausea, and vomiting after lap mariella.  Probable seroma noted in gall bladder fossa.  No bile leak found. Empiric antibiotics given because of leukocytosis and possibility of infected seroma.                  Who to Call     For medical emergencies, please call 911.  For non-urgent questions about your medical care, please call your primary care provider or clinic, [phone]          Attending Provider     Provider Specialty    Kyle Jones MD Internal Medicine       Primary Care Provider Office Phone # Fax #    Ron Nicholas [phone]      After Care Instructions     Activity       Your activity upon discharge: activity as tolerated            Diet       Follow this diet upon discharge: Regular                  Follow-up Appointments     Follow-up and recommended labs  "and tests        Follow up with General Surgeon (Dr. Coyne) in < 10 days with CBC.  Call surgeon if pain or nausea worsens or if you develop fever.                  Pending Results     No orders found for last 3 day(s).            Statement of Approval     Ordered          18 1326  I have reviewed and agree with all the recommendations and orders detailed in this document.  EFFECTIVE NOW     Approved and electronically signed by:  Henry Aguilera MD           18 1327  I have reviewed and agree with all the recommendations and orders detailed in this document.  EFFECTIVE NOW     Approved and electronically signed by:  Henry Aguilera MD             Admission Information     Date & Time Department Dept. Phone    [address] [place] [address] Medical Surgical [phone]      Your Vitals Were     Blood Pressure Pulse Temperature Respirations Height Weight    128/78 (BP Location: Right arm) 68 96.4  F (35.8  C) (Oral) 18 1.626 m (5' 4\") 89.8 kg (198 lb)    Last Period Pulse Oximetry BMI (Body Mass Index)             10/10/2018 98% 33.99 kg/m2         [employer] Information     [employer] lets you send messages to your doctor, view your test results, renew your prescriptions, schedule appointments and more. To sign up, go to [email or web address] . Click on \"Log in\" on the left side of the screen, which will take you to the Welcome page. Then click on \"Sign up Now\" on the right side of the page.     You will be asked to enter the access code listed below, as well as some personal information. Please follow the directions to create your username and password.     Your access code is: [email or web address]  Expires: 1/10/2019 10:20 AM     Your access code will  in 90 days. If you need help or a new code, please call your [facility] clinic or [phone].        Care EveryWhere ID     This is your Care EveryWhere ID. This could be used by other organizations to access your [facility] medical records  [id number]   "      Equal Access to Services     [facility]: Hadii aad ku hadluis miguelbrady Somarin, waaxda luqadaha, qaybta kaalmamarina qiu, jaguar hoover. So [facility] [phone].    ATENCIÓN: Si ardenla tracie, tiene a hogan disposición servicios gratuitos de asistencia lingüística. Chinmayame al [phone].    We comply with applicable federal civil rights laws and [place] laws. We do not discriminate on the basis of race, color, national origin, age, disability, sex, sexual orientation, or gender identity.               Review of your medicines      START taking        Dose / Directions    amoxicillin-clavulanate 875-125 MG per tablet   Commonly known as:  AUGMENTIN        Dose:  1 tablet   Take 1 tablet by mouth 2 times daily   Quantity:  20 tablet   Refills:  0       ibuprofen 200 MG tablet   Commonly known as:  ADVIL/MOTRIN   Used for:  Pain        Dose:  200-400 mg   Take 1-2 tablets (200-400 mg) by mouth every 4 hours as needed for moderate pain   Refills:  0       oxyCODONE IR 5 MG tablet   Commonly known as:  ROXICODONE   Used for:  Pain        Dose:  5-10 mg   Take 1-2 tablets (5-10 mg) by mouth every 6 hours as needed for moderate to severe pain   Quantity:  40 tablet   Refills:  0         CONTINUE these medicines which have NOT CHANGED        Dose / Directions    acetaminophen 325 MG tablet   Commonly known as:  TYLENOL        Dose:  650 mg   Take 650 mg by mouth every 6 hours as needed for mild pain   Refills:  0       hydrOXYzine 25 MG tablet   Commonly known as:  ATARAX        Dose:  25-50 mg   Take 25-50 mg by mouth 3 times daily as needed for itching   Refills:  0       JULEBER 0.15-30 MG-MCG per tablet   Generic drug:  desogestrel-ethinyl estradiol        Dose:  1 tablet   Take 1 tablet by mouth daily   Refills:  0       medical cannabis (Patient's own supply.  Not a prescription)        Dose:  1 Dose   1 Dose See Admin Instructions (This is NOT a prescription, and does not certify that the  patient has a qualifying medical condition for medical cannabis.  The purpose of this order is  to document that the patient reports taking medical cannabis.)   Refills:  0       ondansetron 4 MG ODT tab   Commonly known as:  ZOFRAN-ODT        Dose:  4 mg   Take 4 mg by mouth every 8 hours as needed for nausea   Refills:  0       prochlorperazine 10 MG tablet   Commonly known as:  COMPAZINE        Dose:  10 mg   Take 10 mg by mouth every 6 hours as needed for nausea or vomiting   Refills:  0       TIZANIDINE HCL PO        Dose:  4 mg   Take 4 mg by mouth 6 times daily   Refills:  0       venlafaxine 37.5 MG 24 hr capsule   Commonly known as:  EFFEXOR-XR        Dose:  112.5 mg   Take 112.5 mg by mouth daily   Refills:  0            Where to get your medicines      These medications were sent to [place], [place] - [address] [facility]  [address] [facility] [address]     Phone:  [phone]     amoxicillin-clavulanate 875-125 MG per tablet         Some of these will need a paper prescription and others can be bought over the counter. Ask your nurse if you have questions.     Bring a paper prescription for each of these medications     oxyCODONE IR 5 MG tablet       You don't need a prescription for these medications     ibuprofen 200 MG tablet                Protect others around you: Learn how to safely use, store and throw away your medicines at www.disposemymeds.org.        ANTIBIOTIC INSTRUCTION     You've Been Prescribed an Antibiotic - Now What?  Your healthcare team thinks that you or your loved one might have an infection. Some infections can be treated with antibiotics, which are powerful, life-saving drugs. Like all medications, antibiotics have side effects and should only be used when necessary. There are some important things you should know about your antibiotic treatment.      Your healthcare team may run tests before you start taking an antibiotic.    Your  team may take samples (e.g., from your blood, urine or other areas) to run tests to look for bacteria. These test can be important to determine if you need an antibiotic at all and, if you do, which antibiotic will work best.      Within a few days, your healthcare team might change or even stop your antibiotic.    Your team may start you on an antibiotic while they are working to find out what is making you sick.    Your team might change your antibiotic because test results show that a different antibiotic would be better to treat your infection.    In some cases, once your team has more information, they learn that you do not need an antibiotic at all. They may find out that you don't have an infection, or that the antibiotic you're taking won't work against your infection. For example, an infection caused by a virus can't be treated with antibiotics. Staying on an antibiotic when you don't need it is more likely to be harmful than helpful.      You may experience side effects from your antibiotic.    Like all medications, antibiotics have side effects. Some of these can be serious.    Let you healthcare team know if you have any known allergies when you are admitted to the hospital.    One significant side effect of nearly all antibiotics is the risk of severe and sometimes deadly diarrhea caused by Clostridium difficile (C. Difficile). This occurs when a person takes antibiotics because some good germs are destroyed. Antibiotic use allows C. diificile to take over, putting patients at high risk for this serious infection.    As a patient or caregiver, it is important to understand your or your loved one's antibiotic treatment. It is especially important for caregivers to speak up when patients can't speak for themselves. Here are some important questions to ask your healthcare team.    What infection is this antibiotic treating and how do you know I have that infection?    What side effects might occur from  this antibiotic?    How long will I need to take this antibiotic?    Is it safe to take this antibiotic with other medications or supplements (e.g., vitamins) that I am taking?     Are there any special directions I need to know about taking this antibiotic? For example, should I take it with food?    How will I be monitored to know whether my infection is responding to the antibiotic?    What tests may help to make sure the right antibiotic is prescribed for me?      Information provided by:  www.cdc.gov/getsmart  U.S. Department of Health and Human Services  Centers for disease Control and Prevention  National Center for Emerging and Zoonotic Infectious Diseases  Division of Healthcare Quality Promotion        Information about OPIOIDS     PRESCRIPTION OPIOIDS: WHAT YOU NEED TO KNOW   We gave you an opioid (narcotic) pain medicine. It is important to manage your pain, but opioids are not always the best choice. You should first try all the other options your care team gave you. Take this medicine for as short a time (and as few doses) as possible.    Some activities can increase your pain, such as bandage changes or therapy sessions. It may help to take your pain medicine 30 to 60 minutes before these activities. Reduce your stress by getting enough sleep, working on hobbies you enjoy and practicing relaxation or meditation. Talk to your care team about ways to manage your pain beyond prescription opioids.    These medicines have risks:    DO NOT drive when on new or higher doses of pain medicine. These medicines can affect your alertness and reaction times, and you could be arrested for driving under the influence (DUI). If you need to use opioids long-term, talk to your care team about driving.    DO NOT operate heavy machinery    DO NOT do any other dangerous activities while taking these medicines.    DO NOT drink any alcohol while taking these medicines.     If the opioid prescribed includes acetaminophen, DO  NOT take with any other medicines that contain acetaminophen. Read all labels carefully. Look for the word  acetaminophen  or  Tylenol.  Ask your pharmacist if you have questions or are unsure.    You can get addicted to pain medicines, especially if you have a history of addiction (chemical, alcohol or substance dependence). Talk to your care team about ways to reduce this risk.    All opioids tend to cause constipation. Drink plenty of water and eat foods that have a lot of fiber, such as fruits, vegetables, prune juice, apple juice and high-fiber cereal. Take a laxative (Miralax, milk of magnesia, Colace, Senna) if you don t move your bowels at least every other day. Other side effects include upset stomach, sleepiness, dizziness, throwing up, tolerance (needing more of the medicine to have the same effect), physical dependence and slowed breathing.    Store your pills in a secure place, locked if possible. We will not replace any lost or stolen medicine. If you don t finish your medicine, please throw away (dispose) as directed by your pharmacist. The [place] Pollution Control Agency has more information about safe disposal: https://www.pca.[facility].us/living-green/managing-unwanted-medications             Medication List: This is a list of all your medications and when to take them. Check marks below indicate your daily home schedule. Keep this list as a reference.      Medications           Morning Afternoon Evening Bedtime As Needed    acetaminophen 325 MG tablet   Commonly known as:  TYLENOL   Take 650 mg by mouth every 6 hours as needed for mild pain   Last time this was given:  650 mg on 11/5/2018 11:41 AM                                   amoxicillin-clavulanate 875-125 MG per tablet   Commonly known as:  AUGMENTIN   Take 1 tablet by mouth 2 times daily                                      hydrOXYzine 25 MG tablet   Commonly known as:  ATARAX   Take 25-50 mg by mouth 3 times daily as needed for itching    Last time this was given:  5 mg on 11/4/2018  9:20 AM                                   ibuprofen 200 MG tablet   Commonly known as:  ADVIL/MOTRIN   Take 1-2 tablets (200-400 mg) by mouth every 4 hours as needed for moderate pain   Last time this was given:  600 mg on 11/5/2018 11:41 AM                                   JULEBER 0.15-30 MG-MCG per tablet   Take 1 tablet by mouth daily   Generic drug:  desogestrel-ethinyl estradiol                                   medical cannabis (Patient's own supply.  Not a prescription)   1 Dose See Admin Instructions (This is NOT a prescription, and does not certify that the patient has a qualifying medical condition for medical cannabis.  The purpose of this order is  to document that the patient reports taking medical cannabis.)                                   ondansetron 4 MG ODT tab   Commonly known as:  ZOFRAN-ODT   Take 4 mg by mouth every 8 hours as needed for nausea                                   oxyCODONE IR 5 MG tablet   Commonly known as:  ROXICODONE   Take 1-2 tablets (5-10 mg) by mouth every 6 hours as needed for moderate to severe pain   Last time this was given:  10 mg on 11/5/2018 12:54 PM                                   prochlorperazine 10 MG tablet   Commonly known as:  COMPAZINE   Take 10 mg by mouth every 6 hours as needed for nausea or vomiting                                   TIZANIDINE HCL PO   Take 4 mg by mouth 6 times daily   Last time this was given:  4 mg on 11/5/2018 11:41 AM                                            venlafaxine 37.5 MG 24 hr capsule   Commonly known as:  EFFEXOR-XR   Take 112.5 mg by mouth daily                                             More Information        Postsurgical Seroma    A seroma is a sterile collection of fluid under the skin, usually at the site of a surgical incision. Fluid builds up under the skin where tissue was removed. It may form soon after your surgery. Or it may form up to about 1 to 2 weeks after  surgery. It may look like a swollen lump and feel tender or sore.  A small seroma is not dangerous. Depending on its size and symptoms, it may not need to be treated. The seroma may go away on its own within a few weeks or months. Your body slowly absorbs the fluid. No medicine will make it go away faster. But if you have a large seroma or if it is causing pain, your healthcare provider may drain it. This is done with a syringe and needle. Or the provider may put in a drain. Seromas can return and may need to be drained multiple times. Rarely, you may need a minor procedure to remove the seroma. Long-term problems from a seroma are rare.  Home care  You may be given medicines to relieve pain. These may include acetaminophen and ibuprofen. Take these as directed. Check the seroma daily for the signs of infection listed below.  Follow-up care  Follow up with your healthcare provider, or as advised.  When to seek medical advice  Call your healthcare provider right away if you have signs of infection:    Fever of 100.4 F (38 C) or higher, or as directed by your healthcare provider    Seroma or skin around it feels warm    Pain in the seroma that gets worse    Redness or swelling that gets worse  Also call your provider right away if any of these occur:    Drainage from the seroma that is white or colored or very bloody. Clear or slightly bloody drainage is normal.    Wound opens up    Rapid heart rate    Shortness of breath  Date Last Reviewed: 1/1/2017 2000-2017 [facility]. [address] [place] [address], [place] [address]. All rights reserved. This information is not intended as a substitute for professional medical care. Always follow your healthcare professional's instructions.

## 2018-11-04 NOTE — PROVIDER NOTIFICATION
MD paged concerning inc/sustained BP & HR. Will await any new orders.     Dr. Jones came to see pt. Spoke with her about current status and plan. MD to increase pain management & BP control. UA & HCG samples needed. Will continue to monitor.

## 2018-11-04 NOTE — IP AVS SNAPSHOT
[place] [address] Medical Surgical    [address]    [facility] [address]    Phone:  [phone]    Fax:  [phone]                                       After Visit Summary   11/4/2018    Sabina Herron    MRN: [id number]           After Visit Summary Signature Page     I have received my discharge instructions, and my questions have been answered. I have discussed any challenges I see with this plan with the nurse or doctor.    ..........................................................................................................................................  Patient/Patient Representative Signature      ..........................................................................................................................................  Patient Representative Print Name and Relationship to Patient    ..................................................               ................................................  Date                                   Time    ..........................................................................................................................................  Reviewed by Signature/Title    ...................................................              ..............................................  Date                                               Time          22EPIC Rev 08/18

## 2018-11-04 NOTE — CONSULTS
General Surgery Consultation    Sabina Herron MRN# [id number]   Age: 33 year old YOB: 1985     Date of Admission:  11/4/2018    Reason for consult:            Abdominal pain, nausea/vomiting        Requesting physician:            Dr. Jones                Assessment and Plan:   Assessment:   Sabina Herron is a 33 year old female 11 days status post laparoscopic cholecystectomy who presents with right upper quadrant abdominal pain, nausea and vomiting.    Comorbidities:   has a past medical history of Anxiety; Depressive disorder; Edinson-Danlos syndrome; Migraines; Other chronic pain; and Panic disorder.      Plan:   HIDA reassuring.  LFTs wnl.  No evidence of bile leak, or biliary obstruction.  Elevated WBC and 3 cm fluid collection in gallbladder fossa may represent a small abscess.  Will start zosyn.                 Chief Complaint:   Abdominal pain     History is obtained from the patient         History of Present Illness:   Sabina Herron is a 33 year old female who presents with right upper quadrant abdominal pain, nausea and vomiting which began yesterday after 4pm.  She reports abdominal pain, nausea vomiting intermittently since surgery 11 days ago.  Negative for associated symptoms of diarrhea, fever and chills.              Past Medical History:    has a past medical history of Anxiety; Depressive disorder; Edinson-Danlos syndrome; Migraines; Other chronic pain; and Panic disorder.          Past Surgical History:     Past Surgical History:   Procedure Laterality Date     ABDOMEN SURGERY      bowel resection     BACK SURGERY      hernia repair low back     GI SURGERY      gastric resection     HAND SURGERY       HERNIA REPAIR       LAPAROSCOPIC CHOLECYSTECTOMY N/A 10/24/2018    Procedure: LAPAROSCOPIC CHOLECYSTECTOMY;  Surgeon: Beba Coyne MD;  Location: [facility] OR     ORTHOPEDIC SURGERY      rt hand surgery     TONSILLECTOMY               Social History:     Social History  "  Substance Use Topics     Smoking status: Never Smoker     Smokeless tobacco: Never Used     Alcohol use Yes      Comment: rarely              Family History:   Family history reviewed and not pertinent.         Allergies:   All allergies reviewed and addressed          Medications:     No current facility-administered medications on file prior to encounter.   Current Outpatient Prescriptions on File Prior to Encounter:  medical cannabis (Patient's own supply.  Not a prescription) 1 Dose See Admin Instructions (This is NOT a prescription, and does not certify that the patient has a qualifying medical condition for medical cannabis.  The purpose of this order is  to document that the patient reports taking medical cannabis.)   TIZANIDINE HCL PO Take 4 mg by mouth 6 times daily        famotidine  20 mg Intravenous Q12H     tiZANidine (ZANAFLEX) tablet 4 mg  4 mg Oral 6x Daily            Review of Systems:   The Review of Systems is negative other than noted in the HPI.          Physical Exam:   BP (!) 168/97  Pulse 120  Temp 96.3  F (35.7  C) (Oral)  Resp 20  Ht 1.626 m (5' 4\")  Wt 89.8 kg (198 lb)  LMP 10/10/2018  SpO2 98%  BMI 33.99 kg/m2  General - Well developed, well nourished female in no apparent distress  HEENT:  Head normocephalic and atraumatic, pupils equal and round, conjunctivae clear, no scleral icterus, mucous membranes moist, external ears and nose normal  Neck: Supple without thyromegaly or masses  Lymphatic: No cervical, or supraclavicular lymphadenopathy  Lungs: Clear to auscultation bilaterally  Heart: regular rate and rhythm, no murmurs  Abdomen:   soft, rounded, non-distended with mild incisional tenderness.  No erythema or subcutaneous masses at the incision sites.  No rebound or guarding. No masses palpated.  Extremities: Warm without edema  Neurologic: alert, speech is clear, moves all extremities with good strength  Psychiatric: Mood and affect appropriate  Skin: Without lesions, " alton carney         Data:     Lab Results   Component Value Date    WBC 19.3 11/04/2018     Lab Results   Component Value Date    HGB 11.9 11/04/2018     Lab Results   Component Value Date     11/04/2018     Last Basic Metabolic Panel:  Lab Results   Component Value Date     11/04/2018      Lab Results   Component Value Date    POTASSIUM 4.3 11/04/2018     Lab Results   Component Value Date    CHLORIDE 107 11/04/2018     Lab Results   Component Value Date    CAN 8.7 11/04/2018     Lab Results   Component Value Date    CO2 16 11/04/2018     Lab Results   Component Value Date    BUN 6 11/04/2018     Lab Results   Component Value Date    CR 0.43 11/04/2018     Lab Results   Component Value Date     11/04/2018       Liver Function Studies -   Recent Labs   Lab Test  11/04/18   0813   PROTTOTAL  7.9   ALBUMIN  3.7   BILITOTAL  0.3   ALKPHOS  338*   AST  49*   ALT  45       Imaging:  CT a/p at OSH reviewed with ED phys from [facility]  3 cm fluid collection in gallbladder fossa.  Seroma vs biloma vs abscess.    Recent Results (from the past 24 hour(s))   NM HepatOBiliary Scan    Narrative    NUCLEAR MEDICINE HEPATOBILIARY SCAN 11/4/2018 11:52 AM.     HISTORY: Status post laparoscopic cholecystectomy. Cholecystectomy  10/24/2018 with abdominal pain nausea and vomiting since surgery  Evaluate for bile leak.    TECHNIQUE : The patient received Contrast: 6.4 mCi Tc99m Mebrofenin IV  at 1035. Images out to 60 minutes.    FINDINGS : Prompt visualization of activity in the common bile duct by  4 minutes with activity in the duodenum by 6 minutes. There is  progressive activity identified in the bowel. No evidence for bile  leak.      Impression    IMPRESSION: No evidence for bile leak. No evidence for duct  obstruction.    HALLIE MCDONNELL MD       This note was created using voice recognition software. Undetected word substitutions or other errors may have occurred.     Time spent with the  patient, reviewing the EMR, reviewing laboratory and imaging studies, more than 50% of which was counseling and coordinating care:  20 minutes.    Mae Dorsey MD

## 2018-11-04 NOTE — PHARMACY-ADMISSION MEDICATION HISTORY
Admission medication history interview status for this patient is complete. See [facility] admission navigator for allergy information, prior to admission medications and immunization status.     Medication history interview source(s):Patient  Medication history resources (including written lists, pill bottles, clinic record): Outside Rx fills via [facility]  [facility]    Changes made to PTA medication list:  Added: Compazine, Zofran ODT, contraceptive pill, acetaminophen  Deleted: oxycodone (completed limited post-op supply past week)  Changed: clarified doses per pt for: hydroxyzine & venlafaxine    Actions taken by pharmacist (provider contacted, etc):None     Additional medication history information:   - is out of her contraceptive pill, last taken past week.    Medication reconciliation/reorder completed by provider prior to medication history? Yes    Do you take OTC medications (eg tylenol, ibuprofen, fish oil, eye/ear drops, etc)? Y(Y/N)    For patients on insulin therapy: N (Y/N)    Prior to Admission medications    Medication Sig Last Dose Taking? Auth Provider   acetaminophen (TYLENOL) 325 MG tablet Take 650 mg by mouth every 6 hours as needed for mild pain Past Week at Unknown time Yes Unknown, Entered By History   desogestrel-ethinyl estradiol (JULEBER) 0.15-30 MG-MCG per tablet Take 1 tablet by mouth daily Past Week at is out Yes Unknown, Entered By History   hydrOXYzine (ATARAX) 25 MG tablet Take 25-50 mg by mouth 3 times daily as needed for itching 11/3/2018 at 50mg Yes Unknown, Entered By History   medical cannabis (Patient's own supply.  Not a prescription) 1 Dose See Admin Instructions (This is NOT a prescription, and does not certify that the patient has a qualifying medical condition for medical cannabis.  The purpose of this order is  to document that the patient reports taking medical cannabis.) 11/3/2018 at Unknown time Yes Reported, Patient   ondansetron (ZOFRAN-ODT) 4 MG  ODT tab Take 4 mg by mouth every 8 hours as needed for nausea 11/3/2018 at Unknown time Yes Unknown, Entered By History   prochlorperazine (COMPAZINE) 10 MG tablet Take 10 mg by mouth every 6 hours as needed for nausea or vomiting 11/3/2018 at Unknown time Yes Unknown, Entered By History   TIZANIDINE HCL PO Take 4 mg by mouth 6 times daily  11/3/2018 at Unknown time Yes Reported, Patient   venlafaxine (EFFEXOR-XR) 37.5 MG 24 hr capsule Take 112.5 mg by mouth daily 11/3/2018 at am Yes Unknown, Entered By History

## 2018-11-04 NOTE — H&P
[facility]  Hospitalist Admission Note  Name: Sabina Herron    MRN: [id number]  YOB: 1985    Age: 33 year old  Date of admission: 11/4/2018  Primary care provider: Ron Nicholas            Assessment and Plan:   Sabina Herron is a 33 year old female with known history of sadia Danlos syndrome, depression, anxiety, chronic pain now on medical marijuana and has been off opioids, prior gastric bypass, small bowel resection for hernia back in 2012 and recently underwent laparoscopic cholecystectomy who presented earlier in the emergency room and in another hospital for increasing abdominal pain with dry heaving and nausea but no vomiting spells.     1.  Abdominal pain, recent laparoscopic cholecystectomy with nausea.  2.  CT scan of the abdomen and pelvis showed seroma, sent here to rule out bile leak    Admit as inpatient.  Optimize pain control.  Patient is aware regarding initiation of narcotics/opioids in the setting of this acute pain as she is a known chronic pain patient but has been off opioids since she is on medical marijuana.  Keep her n.p.o. until gets to be seen by general surgery service.  IV fluid support.  Optimize blood pressure control likely an effect of severe pain.  As needed antiemetics.  Check pregnancy test.    Code status: Full  Admit to inpatient  Prophylaxis: Mechanical  Disposition: Home          Chief Complaint:   Increasing abdominal pain       Source of Information:   Patient with good reliability  Discussion with ED physician  Review of E chart records         History of Present Illness:   Sabina Herron is a 33 year old female with known history of sadia Danlos syndrome, depression, anxiety, chronic pain now on medical marijuana and has been off opioids, prior gastric bypass, small bowel resection for hernia back in 2012 and recently underwent laparoscopic cholecystectomy who presented earlier in the emergency room and in another hospital for  increasing abdominal pain with dry heaving and nausea but no vomiting spells.  This started yesterday and was increasing in terms of frequency and intensity that led her to seek medical attention.  She denies any fever, chills, diarrhea, bleeding tendencies, mental status changes, chest pain, shortness of breath, urinary symptoms and denies any sick contacts.  She had an evaluation at the ED and underwent CT scan imaging which reportedly showed seroma and rule out bile leak given recent cholecystectomy.  Her case was discussed by the emergency room service to our general surgery service and referred to us for further management and care hence this inpatient medical hospitalization.  During the time of my examination patient still severe pain with elevated blood pressure levels and heart rate as well.  No hypoxia seen.          Past Medical History:     Past Medical History:   Diagnosis Date     Anxiety      Depressive disorder      Edinson-Danlos syndrome      Migraines      Other chronic pain     Edinson danlos syndrome     Panic disorder              Past Surgical History:     Past Surgical History:   Procedure Laterality Date     ABDOMEN SURGERY      bowel resection     BACK SURGERY      hernia repair low back     GI SURGERY      gastric resection     HAND SURGERY       HERNIA REPAIR       LAPAROSCOPIC CHOLECYSTECTOMY N/A 10/24/2018    Procedure: LAPAROSCOPIC CHOLECYSTECTOMY;  Surgeon: Beba Coyne MD;  Location: [facility] OR     ORTHOPEDIC SURGERY      rt hand surgery     TONSILLECTOMY               Social History:     Social History   Substance Use Topics     Smoking status: Never Smoker     Smokeless tobacco: Never Used     Alcohol use Yes      Comment: rarely              Family History:   Family history was fully reviewed and non-contributory in this case.         Allergies:   No Known Allergies          Medications:     Prior to Admission medications    Medication Sig Last Dose Taking? Auth Provider  "  HYDROXYZINE HCL PO Take 25 mg by mouth 4 times daily as needed for itching Take .5 to 2 tablets (12.5-5-mg total) four times a day as needed for allergies or anxiety 11/3/2018 at Unknown time Yes Reported, Patient   medical cannabis (Patient's own supply.  Not a prescription) 1 Dose See Admin Instructions (This is NOT a prescription, and does not certify that the patient has a qualifying medical condition for medical cannabis.  The purpose of this order is  to document that the patient reports taking medical cannabis.) 11/3/2018 at Unknown time Yes Reported, Patient   oxyCODONE IR (ROXICODONE) 5 MG tablet Take 1-2 tablets (5-10 mg) by mouth every 4 hours as needed for moderate to severe pain Past Week at Unknown time Yes Shira Lopez PA-C   TIZANIDINE HCL PO Take 4 mg by mouth 6 times daily  11/3/2018 at Unknown time Yes Reported, Patient   venlafaxine (EFFEXOR-ER) 37.5 MG TB24 24 hr tablet Take 37.5 mg by mouth daily Right now taking 2 pills a day 11/3/2018 at 1200 Yes Reported, Patient             Review of Systems:   A Comprehensive greater than 10 system review of systems was carried out.  Pertinent positives and negatives are noted above.  Otherwise negative for contributory information.           Physical Exam:   Blood pressure (!) 189/103, pulse 127, temperature 98.3  F (36.8  C), temperature source Oral, resp. rate 20, height 1.626 m (5' 4\"), weight 89.8 kg (198 lb), last menstrual period 10/10/2018, SpO2 97 %, not currently breastfeeding.  Wt Readings from Last 1 Encounters:   11/04/18 89.8 kg (198 lb)     Exam:  GENERAL: No apparent distress. Awake, alert, and fully oriented.  HEENT: Normocephalic, atraumatic. Extraocular movements intact.  CARDIOVASCULAR: Regular rate and rhythm without murmurs or rubs. No JVD  PULMONARY: Clear to auscultation, no wheezes, crackles  ABDOMINAL: Soft, tender upon palpation at the epigastric area, no peritoneal signs  EXTREMITIES: No cyanosis or clubbing. No " edema.  NEUROLOGICAL: CN 2-12 grossly intact, awake and alert x3, spontaneous and coherent speech. no focal neurological deficits.  DERMATOLOGICAL: No rash, ulcer, ecchymoses, jaundice.             Data:   EKG: None seen    Imaging:  CT scan imaging done at [facility] discussed with general surgery by emergency room physician    Labs:  Labs pending as patient transferred from [place] emergency room.    No results for input(s): CULT in the last 168 hours.  No results for input(s): NA, POTASSIUM, CHLORIDE, CO2, ANIONGAP, GLC, BUN, CR, GFRESTIMATED, GFRESTBLACK, CAN in the last 168 hours.  No results for input(s): WBC, HGB, HCT, MCV, PLT in the last 168 hours.  No results for input(s): CR in the last 168 hours.  No results for input(s): SED, CRP in the last 168 hours.  No results for input(s): GLC, BGM in the last 168 hours.  No results for input(s): INR in the last 168 hours.  No results for input(s): TROPONIN, TROPI, TROPR in the last 168 hours.    Invalid input(s): TROP, TROPONINIES

## 2018-11-04 NOTE — PLAN OF CARE
Problem: Pain, Acute (Adult)  Goal: Identify Related Risk Factors and Signs and Symptoms  Related risk factors and signs and symptoms are identified upon initiation of Human Response Clinical Practice Guideline (CPG).   Outcome: No Change  Direct admit from [place] 0400  A&Ox4, up ad sherri  LDA: PIV infusing  Vitals: HR & BP elevated, MD aware  Pain: RUQ, PRN IV dilaudid w/no relief  C/o nausea, PRN zofran & compazine w/min relief  Skin: Incisions WDL  GI/: Voided x1  Plan: Surgery consult this am  Will continue to monitor

## 2018-11-04 NOTE — PLAN OF CARE
"Problem: Patient Care Overview  Goal: Plan of Care/Patient Progress Review  Outcome: Therapy, unable to show any progress toward functional goals  Alert/oriented. Pain is 6-10 R upper abdomen \"by the lap sites\".  IV dilaudid,  Po oxycodone, zofran, IVF, atarax given. NM test completed. Clear-full liquid diet, tolerating small amounts. Voiding well. Start zosyn. Lactic this a.m. Negative. Last BM this morning \"diarrhea for 2 days now\".       "

## 2018-11-05 VITALS
DIASTOLIC BLOOD PRESSURE: 78 MMHG | HEIGHT: 64 IN | TEMPERATURE: 96.4 F | HEART RATE: 68 BPM | RESPIRATION RATE: 18 BRPM | BODY MASS INDEX: 33.8 KG/M2 | OXYGEN SATURATION: 98 % | WEIGHT: 198 LBS | SYSTOLIC BLOOD PRESSURE: 128 MMHG

## 2018-11-05 PROBLEM — K91.872: Status: ACTIVE | Noted: 2018-11-05

## 2018-11-05 LAB
ALBUMIN SERPL-MCNC: 2.9 G/DL (ref 3.4–5)
ALP SERPL-CCNC: 248 U/L (ref 40–150)
ALT SERPL W P-5'-P-CCNC: 30 U/L (ref 0–50)
ANION GAP SERPL CALCULATED.3IONS-SCNC: 7 MMOL/L (ref 3–14)
AST SERPL W P-5'-P-CCNC: 25 U/L (ref 0–45)
BILIRUB SERPL-MCNC: 0.2 MG/DL (ref 0.2–1.3)
BUN SERPL-MCNC: 5 MG/DL (ref 7–30)
CALCIUM SERPL-MCNC: 8.2 MG/DL (ref 8.5–10.1)
CHLORIDE SERPL-SCNC: 108 MMOL/L (ref 94–109)
CO2 SERPL-SCNC: 23 MMOL/L (ref 20–32)
CREAT SERPL-MCNC: 0.46 MG/DL (ref 0.52–1.04)
ERYTHROCYTE [DISTWIDTH] IN BLOOD BY AUTOMATED COUNT: 20.6 % (ref 10–15)
GFR SERPL CREATININE-BSD FRML MDRD: >90 ML/MIN/1.7M2
GLUCOSE SERPL-MCNC: 116 MG/DL (ref 70–99)
HCT VFR BLD AUTO: 33 % (ref 35–47)
HGB BLD-MCNC: 10.1 G/DL (ref 11.7–15.7)
LIPASE SERPL-CCNC: 117 U/L (ref 73–393)
MCH RBC QN AUTO: 24.9 PG (ref 26.5–33)
MCHC RBC AUTO-ENTMCNC: 30.6 G/DL (ref 31.5–36.5)
MCV RBC AUTO: 81 FL (ref 78–100)
PLATELET # BLD AUTO: 561 10E9/L (ref 150–450)
POTASSIUM SERPL-SCNC: 4.1 MMOL/L (ref 3.4–5.3)
PROT SERPL-MCNC: 6.4 G/DL (ref 6.8–8.8)
RBC # BLD AUTO: 4.06 10E12/L (ref 3.8–5.2)
SODIUM SERPL-SCNC: 138 MMOL/L (ref 133–144)
WBC # BLD AUTO: 12.7 10E9/L (ref 4–11)

## 2018-11-05 PROCEDURE — A9270 NON-COVERED ITEM OR SERVICE: HCPCS | Mod: GY | Performed by: PHYSICIAN ASSISTANT

## 2018-11-05 PROCEDURE — 25000132 ZZH RX MED GY IP 250 OP 250 PS 637: Mod: GY | Performed by: INTERNAL MEDICINE

## 2018-11-05 PROCEDURE — 85027 COMPLETE CBC AUTOMATED: CPT | Performed by: SURGERY

## 2018-11-05 PROCEDURE — 36415 COLL VENOUS BLD VENIPUNCTURE: CPT | Performed by: SURGERY

## 2018-11-05 PROCEDURE — A9270 NON-COVERED ITEM OR SERVICE: HCPCS | Mod: GY | Performed by: INTERNAL MEDICINE

## 2018-11-05 PROCEDURE — 25000128 H RX IP 250 OP 636: Performed by: INTERNAL MEDICINE

## 2018-11-05 PROCEDURE — 25000132 ZZH RX MED GY IP 250 OP 250 PS 637: Mod: GY | Performed by: PHYSICIAN ASSISTANT

## 2018-11-05 PROCEDURE — 80053 COMPREHEN METABOLIC PANEL: CPT | Performed by: SURGERY

## 2018-11-05 PROCEDURE — 99239 HOSP IP/OBS DSCHRG MGMT >30: CPT | Performed by: INTERNAL MEDICINE

## 2018-11-05 PROCEDURE — 25000128 H RX IP 250 OP 636: Performed by: SURGERY

## 2018-11-05 PROCEDURE — 25000125 ZZHC RX 250: Performed by: INTERNAL MEDICINE

## 2018-11-05 PROCEDURE — 83690 ASSAY OF LIPASE: CPT | Performed by: SURGERY

## 2018-11-05 RX ORDER — ZOLPIDEM TARTRATE 5 MG/1
5 TABLET ORAL
Status: DISCONTINUED | OUTPATIENT
Start: 2018-11-05 | End: 2018-11-05 | Stop reason: HOSPADM

## 2018-11-05 RX ORDER — OXYCODONE HYDROCHLORIDE 5 MG/1
5-10 TABLET ORAL EVERY 6 HOURS PRN
Qty: 40 TABLET | Refills: 0 | Status: ON HOLD | OUTPATIENT
Start: 2018-11-05 | End: 2018-11-11

## 2018-11-05 RX ORDER — IBUPROFEN 600 MG/1
600 TABLET, FILM COATED ORAL EVERY 6 HOURS
Status: DISCONTINUED | OUTPATIENT
Start: 2018-11-05 | End: 2018-11-05 | Stop reason: HOSPADM

## 2018-11-05 RX ORDER — IBUPROFEN 200 MG
200-400 TABLET ORAL EVERY 4 HOURS PRN
COMMUNITY
Start: 2018-11-05

## 2018-11-05 RX ORDER — ACETAMINOPHEN 325 MG/1
650 TABLET ORAL EVERY 4 HOURS
Status: DISCONTINUED | OUTPATIENT
Start: 2018-11-05 | End: 2018-11-05 | Stop reason: HOSPADM

## 2018-11-05 RX ADMIN — OXYCODONE HYDROCHLORIDE 10 MG: 5 TABLET ORAL at 12:54

## 2018-11-05 RX ADMIN — ZOLPIDEM TARTRATE 5 MG: 5 TABLET, COATED ORAL at 03:09

## 2018-11-05 RX ADMIN — HYDROMORPHONE HYDROCHLORIDE 0.5 MG: 1 INJECTION, SOLUTION INTRAMUSCULAR; INTRAVENOUS; SUBCUTANEOUS at 00:01

## 2018-11-05 RX ADMIN — SODIUM CHLORIDE, PRESERVATIVE FREE: 5 INJECTION INTRAVENOUS at 00:01

## 2018-11-05 RX ADMIN — IBUPROFEN 600 MG: 600 TABLET, FILM COATED ORAL at 11:41

## 2018-11-05 RX ADMIN — FAMOTIDINE 20 MG: 10 INJECTION, SOLUTION INTRAVENOUS at 03:20

## 2018-11-05 RX ADMIN — SODIUM CHLORIDE, PRESERVATIVE FREE: 5 INJECTION INTRAVENOUS at 08:38

## 2018-11-05 RX ADMIN — TIZANIDINE 4 MG: 4 TABLET ORAL at 00:16

## 2018-11-05 RX ADMIN — TIZANIDINE 4 MG: 4 TABLET ORAL at 11:41

## 2018-11-05 RX ADMIN — TAZOBACTAM SODIUM AND PIPERACILLIN SODIUM 3.38 G: 375; 3 INJECTION, SOLUTION INTRAVENOUS at 02:41

## 2018-11-05 RX ADMIN — TIZANIDINE 4 MG: 4 TABLET ORAL at 14:10

## 2018-11-05 RX ADMIN — Medication 5 MG: at 14:10

## 2018-11-05 RX ADMIN — TAZOBACTAM SODIUM AND PIPERACILLIN SODIUM 3.38 G: 375; 3 INJECTION, SOLUTION INTRAVENOUS at 08:38

## 2018-11-05 RX ADMIN — OXYCODONE HYDROCHLORIDE 10 MG: 5 TABLET ORAL at 08:46

## 2018-11-05 RX ADMIN — HYDROMORPHONE HYDROCHLORIDE 0.5 MG: 1 INJECTION, SOLUTION INTRAMUSCULAR; INTRAVENOUS; SUBCUTANEOUS at 02:53

## 2018-11-05 RX ADMIN — TIZANIDINE 4 MG: 4 TABLET ORAL at 06:38

## 2018-11-05 RX ADMIN — ACETAMINOPHEN 650 MG: 325 TABLET, FILM COATED ORAL at 11:41

## 2018-11-05 ASSESSMENT — ACTIVITIES OF DAILY LIVING (ADL)
ADLS_ACUITY_SCORE: 11

## 2018-11-05 NOTE — PLAN OF CARE
Problem: Patient Care Overview  Goal: Plan of Care/Patient Progress Review  Outcome: Therapy, progress toward functional goals as expected  Orientation: Alert and oriented x4  VSS. 99% on RA.   HR 52-58 bpm.   LS: Clear and equal  GI:  Passing gas. No BM. Denies N/V.   : Adequate urine output.   Skin: Clean and dry. abd incision is CDI.   Activity: Independent. . Pt sleptokay throughout shift. Given Ambien for assistance in sleeping.  Pain: 6/10. Given IV dilaudid twice  Plan: Continue with current cares. Pt on full liquid diet, tolerating well could be bumped to reg. VSS good except for low HR. Afebrile. On IV zosyn. NS at 100 ml/hr.

## 2018-11-05 NOTE — PLAN OF CARE
"Problem: Patient Care Overview  Goal: Discharge Needs Assessment  Outcome: Adequate for Discharge Date Met: 11/05/18  Pt has continued to have ABD pain, however is able to conduct activities of daily living  As she is ambulating and took a shower. Pain is located to right side of ABD, rated 6/10.  Educated at discharge on hospital course, seroma details, follow up, ABX and opioid medications.  Pt verbalized understanding of medications timing and indications, why to follow up with surgeon and lab, CBC.  /78 (BP Location: Right arm)  Pulse 68  Temp 96.4  F (35.8  C) (Oral)  Resp 18  Ht 1.626 m (5' 4\")  Wt 89.8 kg (198 lb)  LMP 10/10/2018  SpO2 98%  BMI 33.99 kg/m2   Patient's After Visit Summary was reviewed with patient.  Patient verbalized understanding of After Visit Summary, recommended follow up and was given an opportunity to ask questions.   Discharge medications sent home with patient/family: YES.   Discharged with significant other.          "

## 2018-11-05 NOTE — PLAN OF CARE
Problem: Pain, Acute (Adult)  Goal: Identify Related Risk Factors and Signs and Symptoms  Related risk factors and signs and symptoms are identified upon initiation of Human Response Clinical Practice Guideline (CPG).   Outcome: No Change  VSS, afebrile and RA sats  Mid 90's.  Pain R abd, IV Dilaudid x 2, did decrease pain but did not completely relieve it.  Abd lap sites C\D\I.  Cont on IV Zosyn.  Deborah full liquids.  POC reviewed with pt SOL sanchez, questions answered.

## 2018-11-05 NOTE — PROGRESS NOTES
"[facility]   General Surgery Progress Note           Assessment and Plan:   Assessment:   Admission for N/V and RUQ pain.    S/p lap mariella for symptomatic cholelithiasis 10/24/18  Tachycardia, resolved  Leukocytosis improving  HIDA negative for bile leak, LFTs wnl  Edinson-Danlos syndrome  Chronic pain      Plan:   -reg diet  -pain control: Scheduled Tylenol and Ibuprofen, Oxycodone PRN.  Discontinue IV Dilaudid.   -abx: IV Zosyn   -PO Pepcid  -OK to discharge later today from surgical perspective if pain controlled with PO meds         Interval History:   Afebrile.  Feels improved today, denies nausea and tolerating full liquid diet.  Primary c/o pain at incisions, has been controlled with PRN  IV Dilaudid in addition to Oxycodone 10 mg q 4 hours.  Reports tendency to have complications following surgeries.  Up walking in room ok, voiding independently.  +passing flatus and +BM yesterday.  Interested in going home today.         Physical Exam:   Blood pressure 128/78, pulse 68, temperature 96.4  F (35.8  C), temperature source Oral, resp. rate 18, height 1.626 m (5' 4\"), weight 89.8 kg (198 lb), last menstrual period 10/10/2018, SpO2 98 %, not currently breastfeeding.    I/O last 3 completed shifts:  In: 949 [P.O.:280; I.V.:669]  Out: 700 [Urine:700]    Abdomen:   soft, non-distended, mildly tender at RUQ and incisions and normal bowel sounds   Inc(s) - clean, dry, intact              Data:       Recent Labs  Lab 11/05/18  0608 11/04/18  0705   HGB 10.1* 11.9       Recent Labs  Lab 11/05/18  0608 11/04/18  0705   WBC 12.7* 19.3*       Dominga Bryan PA-C       "

## 2018-11-05 NOTE — DISCHARGE SUMMARY
"Discharge Summary    Sabina Herron MRN# [id number]   YOB: 1985 Age: 33 year old     Date of Admission:  11/4/2018  Date of Discharge:  11/5/2018  Admitting Physician:  Kyle Jones MD  Discharge Physician:  Henry Aguilera MD  Discharging Service:  Hospitalist       Primary Provider: Ron Nicholas          Discharge Diagnosis:   1.  Abdominal pain, nausea, and vomiting with leukocytosis after recent laparoscopic cholecystectomy on 10/24/18.  2.  Probable seroma noted in gall bladder fossa on CT at outside facility.  3.  HIDA scan not suggestive of bile leak.  4.  Stable medical conditions include:  Edinson Danlos syndrome, depression, anxiety, and chronic pain for which she now uses medical marijuana               Discharge Disposition:   Discharged to home           Allergies:   No Known Allergies             Condition on Discharge:   Discharge condition: Stable   Discharge vitals: Blood pressure 128/78, pulse 68, temperature 96.4  F (35.8  C), temperature source Oral, resp. rate 18, height 1.626 m (5' 4\"), weight 89.8 kg (198 lb), last menstrual period 10/10/2018, SpO2 98 %, not currently breastfeeding.   Code status on discharge: Full Code   Physical exam on day of discharge:   GENERAL:  Comfortable. Cooperative.  PSYCH: pleasant, oriented, No acute distress.  EYES: PERRLA, Normal conjunctiva.  HEART:  Regular rate and rhythm. No JVD. Pulses normal. No edema.  LUNGS:  Clear to auscultation, normal Respiratory effort.  ABDOMEN:  Soft, no hepatosplenomegaly, normal bowel sounds.  EXTREMETIES: No clubbing, cyanosis or ischemia  SKIN:  Dry to touch, No rash.         History of Present Illness and Hospital Course:     See detailed admission note for full details.  Sabina Herron is a 33 year old female with history of Edinson Danlos syndrome, anxiety, depression, chronic pain managed with medical marijuana, previous gastric bypass, small bowel resection and hernia repair in 2012, and " recent laparoscopic cholecystectomy.  She is postop after cholecystectomy on 10/24/18.  She went to urgent care early in the morning of 11/4/18 for evaluation of abdominal pain, nausea, and dry heaves.  Workup at urgent care included CT scan which suggested probable seroma (fluid in the gallbladder fossa).  She was referred here for further evaluation.  Emergency department evaluation showed leukocytosis with white blood cell count of 19.3.  She was admitted for further evaluation and treatment.  There was concern about possible bile leak.  Surgery was consulted.  HIDA scan was obtained and showed no evidence of bile leak.  Zosyn was started, empirically. Sabina's symptoms improved.  WBC came down some to 12.7.  Surgery is recommending discharge home with outpatient surgery follow up with her surgeon (Dr. Coyne).  Sabina is feeling much better and wants to discharge home this afternoon. I am discharging her home on Augmentin for possible infected seroma.  I am recommending that she follow up with Dr. Coyne in less than 10 days.            Procedures / Imaging:   HIDA scan           Consultations:   Consultation during this admission received from surgery             Pending Results:   None           Discharge Instructions and Follow-Up:   Discharge diet: Regular   Discharge activity: Activity as tolerated   Discharge follow-up: Follow up with Dr. Coyne in < 10 days   Outpatient therapy: None    Other instructions: Call surgery clinic for worsening of abdominal pain, nausea, or vomiting or for development of fever.             Discharge Medications:   Current Discharge Medication List      START taking these medications    Details   amoxicillin-clavulanate (AUGMENTIN) 875-125 MG per tablet Take 1 tablet by mouth 2 times daily  Qty: 20 tablet, Refills: 0    Associated Diagnoses: Seroma of digestive system after digestive system procedure      ibuprofen (ADVIL/MOTRIN) 200 MG tablet Take 1-2 tablets (200-400 mg)  by mouth every 4 hours as needed for moderate pain    Associated Diagnoses: Pain      oxyCODONE IR (ROXICODONE) 5 MG tablet Take 1-2 tablets (5-10 mg) by mouth every 6 hours as needed for moderate to severe pain  Qty: 40 tablet, Refills: 0    Associated Diagnoses: Pain         CONTINUE these medications which have NOT CHANGED    Details   acetaminophen (TYLENOL) 325 MG tablet Take 650 mg by mouth every 6 hours as needed for mild pain      desogestrel-ethinyl estradiol (JULEBER) 0.15-30 MG-MCG per tablet Take 1 tablet by mouth daily      hydrOXYzine (ATARAX) 25 MG tablet Take 25-50 mg by mouth 3 times daily as needed for itching      medical cannabis (Patient's own supply.  Not a prescription) 1 Dose See Admin Instructions (This is NOT a prescription, and does not certify that the patient has a qualifying medical condition for medical cannabis.  The purpose of this order is  to document that the patient reports taking medical cannabis.)      ondansetron (ZOFRAN-ODT) 4 MG ODT tab Take 4 mg by mouth every 8 hours as needed for nausea      prochlorperazine (COMPAZINE) 10 MG tablet Take 10 mg by mouth every 6 hours as needed for nausea or vomiting      TIZANIDINE HCL PO Take 4 mg by mouth 6 times daily       venlafaxine (EFFEXOR-XR) 37.5 MG 24 hr capsule Take 112.5 mg by mouth daily                Total time spent in face to face contact with the patient and coordinating discharge was:  35 Minutes

## 2018-11-09 ENCOUNTER — TRANSFERRED RECORDS (OUTPATIENT)
Dept: HEALTH INFORMATION MANAGEMENT | Facility: CLINIC | Age: 33
End: 2018-11-09

## 2018-11-10 ENCOUNTER — HOSPITAL ENCOUNTER (OUTPATIENT)
Facility: CLINIC | Age: 33
Setting detail: OBSERVATION
Discharge: HOME | End: 2018-11-11
Attending: INTERNAL MEDICINE | Admitting: INTERNAL MEDICINE
Payer: MEDICARE

## 2018-11-10 DIAGNOSIS — Z90.49 STATUS POST CHOLECYSTECTOMY: Primary | ICD-10-CM

## 2018-11-10 PROBLEM — R10.9 ABDOMINAL PAIN: Status: ACTIVE | Noted: 2018-11-10

## 2018-11-10 LAB
ALBUMIN SERPL-MCNC: 3.1 G/DL (ref 3.4–5)
ALP SERPL-CCNC: 148 U/L (ref 40–150)
ALT SERPL W P-5'-P-CCNC: 27 U/L (ref 0–50)
ANION GAP SERPL CALCULATED.3IONS-SCNC: 8 MMOL/L (ref 3–14)
AST SERPL W P-5'-P-CCNC: 21 U/L (ref 0–45)
BASOPHILS # BLD AUTO: 0 10E9/L (ref 0–0.2)
BASOPHILS NFR BLD AUTO: 0.2 %
BILIRUB SERPL-MCNC: 0.4 MG/DL (ref 0.2–1.3)
BUN SERPL-MCNC: 4 MG/DL (ref 7–30)
CALCIUM SERPL-MCNC: 7.9 MG/DL (ref 8.5–10.1)
CHLORIDE SERPL-SCNC: 106 MMOL/L (ref 94–109)
CO2 SERPL-SCNC: 26 MMOL/L (ref 20–32)
CREAT SERPL-MCNC: 0.46 MG/DL (ref 0.52–1.04)
DIFFERENTIAL METHOD BLD: ABNORMAL
EOSINOPHIL # BLD AUTO: 0 10E9/L (ref 0–0.7)
EOSINOPHIL NFR BLD AUTO: 0.2 %
ERYTHROCYTE [DISTWIDTH] IN BLOOD BY AUTOMATED COUNT: 20.8 % (ref 10–15)
GFR SERPL CREATININE-BSD FRML MDRD: >90 ML/MIN/1.7M2
GLUCOSE SERPL-MCNC: 127 MG/DL (ref 70–99)
HCT VFR BLD AUTO: 31 % (ref 35–47)
HGB BLD-MCNC: 9.5 G/DL (ref 11.7–15.7)
IMM GRANULOCYTES # BLD: 0.1 10E9/L (ref 0–0.4)
IMM GRANULOCYTES NFR BLD: 0.8 %
LIPASE SERPL-CCNC: 90 U/L (ref 73–393)
LYMPHOCYTES # BLD AUTO: 2.4 10E9/L (ref 0.8–5.3)
LYMPHOCYTES NFR BLD AUTO: 18.8 %
MAGNESIUM SERPL-MCNC: 1.9 MG/DL (ref 1.6–2.3)
MCH RBC QN AUTO: 25.6 PG (ref 26.5–33)
MCHC RBC AUTO-ENTMCNC: 30.6 G/DL (ref 31.5–36.5)
MCV RBC AUTO: 84 FL (ref 78–100)
MONOCYTES # BLD AUTO: 0.9 10E9/L (ref 0–1.3)
MONOCYTES NFR BLD AUTO: 6.7 %
NEUTROPHILS # BLD AUTO: 9.5 10E9/L (ref 1.6–8.3)
NEUTROPHILS NFR BLD AUTO: 73.3 %
NRBC # BLD AUTO: 0 10*3/UL
NRBC BLD AUTO-RTO: 0 /100
PLATELET # BLD AUTO: 612 10E9/L (ref 150–450)
POTASSIUM SERPL-SCNC: 3.7 MMOL/L (ref 3.4–5.3)
PROT SERPL-MCNC: 6.6 G/DL (ref 6.8–8.8)
RBC # BLD AUTO: 3.71 10E12/L (ref 3.8–5.2)
SODIUM SERPL-SCNC: 140 MMOL/L (ref 133–144)
WBC # BLD AUTO: 13 10E9/L (ref 4–11)

## 2018-11-10 PROCEDURE — 99221 1ST HOSP IP/OBS SF/LOW 40: CPT | Mod: 24 | Performed by: SURGERY

## 2018-11-10 PROCEDURE — 83690 ASSAY OF LIPASE: CPT | Performed by: INTERNAL MEDICINE

## 2018-11-10 PROCEDURE — 80053 COMPREHEN METABOLIC PANEL: CPT | Performed by: INTERNAL MEDICINE

## 2018-11-10 PROCEDURE — 83735 ASSAY OF MAGNESIUM: CPT | Performed by: INTERNAL MEDICINE

## 2018-11-10 PROCEDURE — G0378 HOSPITAL OBSERVATION PER HR: HCPCS

## 2018-11-10 PROCEDURE — 25000132 ZZH RX MED GY IP 250 OP 250 PS 637: Mod: GY | Performed by: INTERNAL MEDICINE

## 2018-11-10 PROCEDURE — 25000128 H RX IP 250 OP 636: Performed by: INTERNAL MEDICINE

## 2018-11-10 PROCEDURE — 96376 TX/PRO/DX INJ SAME DRUG ADON: CPT

## 2018-11-10 PROCEDURE — A9270 NON-COVERED ITEM OR SERVICE: HCPCS | Mod: GY | Performed by: INTERNAL MEDICINE

## 2018-11-10 PROCEDURE — 36415 COLL VENOUS BLD VENIPUNCTURE: CPT | Performed by: INTERNAL MEDICINE

## 2018-11-10 PROCEDURE — 85025 COMPLETE CBC W/AUTO DIFF WBC: CPT | Performed by: INTERNAL MEDICINE

## 2018-11-10 PROCEDURE — 99219 ZZC INITIAL OBSERVATION CARE,LEVL II: CPT | Performed by: INTERNAL MEDICINE

## 2018-11-10 PROCEDURE — 96374 THER/PROPH/DIAG INJ IV PUSH: CPT

## 2018-11-10 RX ORDER — HYDROMORPHONE HYDROCHLORIDE 1 MG/ML
0.2 INJECTION, SOLUTION INTRAMUSCULAR; INTRAVENOUS; SUBCUTANEOUS
Status: DISCONTINUED | OUTPATIENT
Start: 2018-11-10 | End: 2018-11-11

## 2018-11-10 RX ORDER — ZOLPIDEM TARTRATE 5 MG/1
5 TABLET ORAL
Status: DISCONTINUED | OUTPATIENT
Start: 2018-11-10 | End: 2018-11-11 | Stop reason: HOSPADM

## 2018-11-10 RX ORDER — ONDANSETRON 2 MG/ML
4 INJECTION INTRAMUSCULAR; INTRAVENOUS EVERY 6 HOURS PRN
Status: DISCONTINUED | OUTPATIENT
Start: 2018-11-10 | End: 2018-11-11 | Stop reason: HOSPADM

## 2018-11-10 RX ORDER — NALOXONE HYDROCHLORIDE 0.4 MG/ML
.1-.4 INJECTION, SOLUTION INTRAMUSCULAR; INTRAVENOUS; SUBCUTANEOUS
Status: DISCONTINUED | OUTPATIENT
Start: 2018-11-10 | End: 2018-11-11 | Stop reason: HOSPADM

## 2018-11-10 RX ORDER — SODIUM CHLORIDE 9 MG/ML
INJECTION, SOLUTION INTRAVENOUS CONTINUOUS
Status: DISCONTINUED | OUTPATIENT
Start: 2018-11-10 | End: 2018-11-11 | Stop reason: HOSPADM

## 2018-11-10 RX ADMIN — Medication 0.2 MG: at 20:07

## 2018-11-10 RX ADMIN — Medication 0.2 MG: at 16:27

## 2018-11-10 RX ADMIN — TIZANIDINE 4 MG: 4 TABLET ORAL at 22:04

## 2018-11-10 RX ADMIN — SODIUM CHLORIDE: 9 INJECTION, SOLUTION INTRAVENOUS at 08:00

## 2018-11-10 RX ADMIN — TIZANIDINE 4 MG: 4 TABLET ORAL at 14:53

## 2018-11-10 RX ADMIN — AMOXICILLIN AND CLAVULANATE POTASSIUM 1 TABLET: 875; 125 TABLET, FILM COATED ORAL at 20:07

## 2018-11-10 RX ADMIN — VENLAFAXINE HYDROCHLORIDE 112.5 MG: 75 CAPSULE, EXTENDED RELEASE ORAL at 09:33

## 2018-11-10 RX ADMIN — ZOLPIDEM TARTRATE 5 MG: 5 TABLET, FILM COATED ORAL at 21:57

## 2018-11-10 RX ADMIN — SODIUM CHLORIDE: 9 INJECTION, SOLUTION INTRAVENOUS at 17:31

## 2018-11-10 RX ADMIN — Medication 0.2 MG: at 09:38

## 2018-11-10 RX ADMIN — Medication 0.2 MG: at 13:20

## 2018-11-10 RX ADMIN — AMOXICILLIN AND CLAVULANATE POTASSIUM 1 TABLET: 875; 125 TABLET, FILM COATED ORAL at 09:33

## 2018-11-10 ASSESSMENT — PAIN DESCRIPTION - DESCRIPTORS
DESCRIPTORS: DISCOMFORT
DESCRIPTORS: DISCOMFORT

## 2018-11-10 NOTE — PLAN OF CARE
"Problem: Patient Care Overview  Goal: Plan of Care/Patient Progress Review  Outcome: Improving  PRIMARY DIAGNOSIS: \"GENERIC\" NURSING  OUTPATIENT/OBSERVATION GOALS TO BE MET BEFORE DISCHARGE:  1. ADLs back to baseline: Yes    2. Activity and level of assistance: Ambulating independently.    3. Pain status: Improved but still requiring IV narcotics.    4. Return to near baseline physical activity: Yes     Discharge Planner Nurse   Safe discharge environment identified: Yes  Barriers to discharge: Yes- tolerating sips of water and ice chips, pain still requiring IV dilaudid        Entered by: Aleshia Martin 11/10/2018 2:41 PM     Please review provider order for any additional goals.   Nurse to notify provider when observation goals have been met and patient is ready for discharge.      "

## 2018-11-10 NOTE — IP AVS SNAPSHOT
MRN:[id number]                      After Visit Summary   11/10/2018    Sabina Herron    MRN: [id number]           Thank you!     Thank you for choosing [facility] for your care. Our goal is always to provide you with excellent care. Hearing back from our patients is one way we can continue to improve our services. Please take a few minutes to complete the written survey that you may receive in the mail after you visit. If you would like to speak to someone directly about your visit please contact Patient Relations at [phone]. Thank you!          Patient Information     Date Of Birth          1985        Designated Caregiver       Most Recent Value    Caregiver    Will someone help with your care after discharge? yes    Name of designated caregiver Jason Peters      About your hospital stay     You were admitted on:  November 10, 2018 You last received care in the:  [place] [address] [facility]    You were discharged on:  November 11, 2018        Reason for your hospital stay       You were admitted for abdominal pain and nausea.  Your Augmentin antibiotic is been continued and your white blood cell count is now normal.  General surgery did not feel there is any surgical need for drainage of this fluid.  Recommend you discontinue cannabis for now and follow-up with a bariatric surgeon regarding persistent nausea.                  Who to Call     For medical emergencies, please call 911.  For non-urgent questions about your medical care, please call your primary care provider or clinic, [phone]          Attending Provider     Provider Specialty    Mario Engle MD Internal Medicine       Primary Care Provider Office Phone # Fax #    Ron Nicholas [phone]      After Care Instructions     Activity       Your activity upon discharge: activity as tolerated            Diet       Follow this diet upon discharge: Recommend sticking to full  "liquid diet for the next couple of days until nausea improving then advance to low fiber diet                  Follow-up Appointments     Follow-up and recommended labs and tests        Follow up with primary care provider, Ron Nicholas, within 7 days if symptoms not improving.    Follow-up with a bariatric surgeon if persistent nausea                  Pending Results     No orders found for last 3 day(s).            Statement of Approval     Ordered          18 1414  I have reviewed and agree with all the recommendations and orders detailed in this document.  EFFECTIVE NOW     Approved and electronically signed by:  Magdiel Valdovinos MD             Admission Information     Date & Time Provider Department Dept. Phone    11/10/2018 Mario Engle MD [place] [address] [facility] [phone]      Your Vitals Were     Blood Pressure Pulse Temperature Respirations Height Weight    103/64 (BP Location: Right arm) 64 97.4  F (36.3  C) (Oral) 16 1.6 m (5' 3\") 90 kg (198 lb 8 oz)    Last Period Pulse Oximetry BMI (Body Mass Index)             2017 (Approximate) 96% 35.16 kg/m2         [email or web address] Information     [employer] lets you send messages to your doctor, view your test results, renew your prescriptions, schedule appointments and more. To sign up, go to [email or web address] . Click on \"Log in\" on the left side of the screen, which will take you to the Welcome page. Then click on \"Sign up Now\" on the right side of the page.     You will be asked to enter the access code listed below, as well as some personal information. Please follow the directions to create your username and password.     Your access code is: [email or web address]  Expires: 1/10/2019 10:20 AM     Your access code will  in 90 days. If you need help or a new code, please call your [facility] or [phone].        Care EveryWhere ID     This is your Care EveryWhere ID. This could be used by other organizations to access " your [facility] medical records  [id number]        Equal Access to Services     OPAL AKINS : Hadii teresa Graham, wanatanael navarro, qamohinderta kisha qiu, jaguar hoover. So [facility] [phone].    ATENCIÓN: Si habla español, tiene a hogan disposición servicios gratuitos de asistencia lingüística. [facility] [phone].    We comply with applicable federal civil rights laws and [place] laws. We do not discriminate on the basis of race, color, national origin, age, disability, sex, sexual orientation, or gender identity.               Review of your medicines      CONTINUE these medicines which may have CHANGED, or have new prescriptions. If we are uncertain of the size of tablets/capsules you have at home, strength may be listed as something that might have changed.        Dose / Directions    oxyCODONE IR 5 MG tablet   Commonly known as:  ROXICODONE   This may have changed:  when to take this   Used for:  Status post cholecystectomy        Dose:  5-10 mg   Take 1-2 tablets (5-10 mg) by mouth every 4 hours as needed for moderate to severe pain   Quantity:  10 tablet   Refills:  0         CONTINUE these medicines which have NOT CHANGED        Dose / Directions    acetaminophen 325 MG tablet   Commonly known as:  TYLENOL        Dose:  650 mg   Take 650 mg by mouth every 6 hours as needed for mild pain   Refills:  0       amoxicillin-clavulanate 875-125 MG per tablet   Commonly known as:  AUGMENTIN   Used for:  Seroma of digestive system after digestive system procedure   Notes to Patient:  Continue to take antibiotics until they are gone        Dose:  1 tablet   Take 1 tablet by mouth 2 times daily   Quantity:  20 tablet   Refills:  0       hydrOXYzine 25 MG tablet   Commonly known as:  ATARAX        Dose:  25-50 mg   Take 25-50 mg by mouth 3 times daily as needed for itching   Refills:  0       ibuprofen 200 MG tablet   Commonly known as:  ADVIL/MOTRIN   Used for:  Pain         Dose:  200-400 mg   Take 1-2 tablets (200-400 mg) by mouth every 4 hours as needed for moderate pain   Refills:  0       JULEBER 0.15-30 MG-MCG per tablet   Generic drug:  desogestrel-ethinyl estradiol        Dose:  1 tablet   Take 1 tablet by mouth daily   Refills:  0       medical cannabis (Patient's own supply.  Not a prescription)        Dose:  1 Dose   1 Dose See Admin Instructions (This is NOT a prescription, and does not certify that the patient has a qualifying medical condition for medical cannabis.  The purpose of this order is  to document that the patient reports taking medical cannabis.)   Refills:  0       ondansetron 4 MG ODT tab   Commonly known as:  ZOFRAN-ODT        Dose:  4 mg   Take 4 mg by mouth every 8 hours as needed for nausea   Refills:  0       prochlorperazine 10 MG tablet   Commonly known as:  COMPAZINE        Dose:  10 mg   Take 10 mg by mouth every 6 hours as needed for nausea or vomiting   Refills:  0       TIZANIDINE HCL PO        Dose:  4 mg   Take 4 mg by mouth 6 times daily   Refills:  0       venlafaxine 37.5 MG 24 hr capsule   Commonly known as:  EFFEXOR-XR        Dose:  112.5 mg   Take 112.5 mg by mouth daily   Refills:  0            Where to get your medicines      Some of these will need a paper prescription and others can be bought over the counter. Ask your nurse if you have questions.     Bring a paper prescription for each of these medications     oxyCODONE IR 5 MG tablet                Protect others around you: Learn how to safely use, store and throw away your medicines at www.disposemymeds.org.        Information about OPIOIDS     PRESCRIPTION OPIOIDS: WHAT YOU NEED TO KNOW   We gave you an opioid (narcotic) pain medicine. It is important to manage your pain, but opioids are not always the best choice. You should first try all the other options your care team gave you. Take this medicine for as short a time (and as few doses) as possible.    Some activities can  increase your pain, such as bandage changes or therapy sessions. It may help to take your pain medicine 30 to 60 minutes before these activities. Reduce your stress by getting enough sleep, working on hobbies you enjoy and practicing relaxation or meditation. Talk to your care team about ways to manage your pain beyond prescription opioids.    These medicines have risks:    DO NOT drive when on new or higher doses of pain medicine. These medicines can affect your alertness and reaction times, and you could be arrested for driving under the influence (DUI). If you need to use opioids long-term, talk to your care team about driving.    DO NOT operate heavy machinery    DO NOT do any other dangerous activities while taking these medicines.    DO NOT drink any alcohol while taking these medicines.     If the opioid prescribed includes acetaminophen, DO NOT take with any other medicines that contain acetaminophen. Read all labels carefully. Look for the word  acetaminophen  or  Tylenol.  Ask your pharmacist if you have questions or are unsure.    You can get addicted to pain medicines, especially if you have a history of addiction (chemical, alcohol or substance dependence). Talk to your care team about ways to reduce this risk.    All opioids tend to cause constipation. Drink plenty of water and eat foods that have a lot of fiber, such as fruits, vegetables, prune juice, apple juice and high-fiber cereal. Take a laxative (Miralax, milk of magnesia, Colace, Senna) if you don t move your bowels at least every other day. Other side effects include upset stomach, sleepiness, dizziness, throwing up, tolerance (needing more of the medicine to have the same effect), physical dependence and slowed breathing.    Store your pills in a secure place, locked if possible. We will not replace any lost or stolen medicine. If you don t finish your medicine, please throw away (dispose) as directed by your pharmacist. The [place]  Pollution Control Agency has more information about safe disposal: https://www.pca.[facility].mn.us/living-green/managing-unwanted-medications             Medication List: This is a list of all your medications and when to take them. Check marks below indicate your daily home schedule. Keep this list as a reference.      Medications           Morning Afternoon Evening Bedtime As Needed    acetaminophen 325 MG tablet   Commonly known as:  TYLENOL   Take 650 mg by mouth every 6 hours as needed for mild pain                                   amoxicillin-clavulanate 875-125 MG per tablet   Commonly known as:  AUGMENTIN   Take 1 tablet by mouth 2 times daily   Last time this was given:  1 tablet on 11/11/2018  8:36 AM   Next Dose Due:  11/11, evening    Notes to Patient:  Continue to take antibiotics until they are gone                                      hydrOXYzine 25 MG tablet   Commonly known as:  ATARAX   Take 25-50 mg by mouth 3 times daily as needed for itching                                   ibuprofen 200 MG tablet   Commonly known as:  ADVIL/MOTRIN   Take 1-2 tablets (200-400 mg) by mouth every 4 hours as needed for moderate pain                                   JULEBER 0.15-30 MG-MCG per tablet   Take 1 tablet by mouth daily   Generic drug:  desogestrel-ethinyl estradiol   Next Dose Due:  11/11, when you arrive home                                   medical cannabis (Patient's own supply.  Not a prescription)   1 Dose See Admin Instructions (This is NOT a prescription, and does not certify that the patient has a qualifying medical condition for medical cannabis.  The purpose of this order is  to document that the patient reports taking medical cannabis.)   Next Dose Due:  Return to home schedule                                 ondansetron 4 MG ODT tab   Commonly known as:  ZOFRAN-ODT   Take 4 mg by mouth every 8 hours as needed for nausea                                   oxyCODONE IR 5 MG tablet   Commonly  known as:  ROXICODONE   Take 1-2 tablets (5-10 mg) by mouth every 4 hours as needed for moderate to severe pain   Last time this was given:  5 mg on 11/11/2018 12:42 PM   Next Dose Due:  4:42 pm on 11/11 or later, as needed                                    prochlorperazine 10 MG tablet   Commonly known as:  COMPAZINE   Take 10 mg by mouth every 6 hours as needed for nausea or vomiting                                   TIZANIDINE HCL PO   Take 4 mg by mouth 6 times daily   Last time this was given:  4 mg on 11/10/2018 10:04 PM   Next Dose Due:  Return to home schedule                                 venlafaxine 37.5 MG 24 hr capsule   Commonly known as:  EFFEXOR-XR   Take 112.5 mg by mouth daily   Last time this was given:  112.5 mg on 11/11/2018  8:36 AM   Next Dose Due:  11/12, morning                                              More Information        Polyethylene Glycol powder  Brand Names: GaviLax, GIALAX, GlycoLax, MiraLax, Smooth LAX, [employer]  What is this medicine?  POLYETHYLENE GLYCOL 3350 (dylon ee ETH i eddie; GLYE col) powder is a laxative used to treat constipation. It increases the amount of water in the stool. Bowel movements become easier and more frequent.  How should I use this medicine?  Take this medicine by mouth. The bottle has a measuring cap that is marked with a line. Pour the powder into the cap up to the marked line (the dose is about 1 heaping tablespoon). Add the powder in the cap to a full glass (4 to 8 ounces or 120 to 240 ml) of water, juice, soda, coffee or tea. Mix the powder well. Drink the solution. Take exactly as directed. Do not take your medicine more often than directed.  Talk to your pediatrician regarding the use of this medicine in children. Special care may be needed.  What side effects may I notice from receiving this medicine?  Side effects that you should report to your doctor or health care professional as soon as possible:    diarrhea    difficulty  breathing    itching of the skin, hives, or skin rash    severe bloating, pain, or distension of the stomach    vomiting  Side effects that usually do not require medical attention (report to your doctor or health care professional if they continue or are bothersome):    bloating or gas    lower abdominal discomfort or cramps    nausea  What may interact with this medicine?  Interactions are not expected.  What if I miss a dose?  If you miss a dose, take it as soon as you can. If it is almost time for your next dose, take only that dose. Do not take double or extra doses.  Where should I keep my medicine?  Keep out of the reach of children.  Store between 15 and 30 degrees C (59 and 86 degrees F). Throw away any unused medicine after the expiration date.  What should I tell my health care provider before I take this medicine?  They need to know if you have any of these conditions:    a history of blockage of the stomach or intestine    current abdomen distension or pain    difficulty swallowing    diverticulitis, ulcerative colitis, or other chronic bowel disease    phenylketonuria    an unusual or allergic reaction to polyethylene glycol, other medicines, dyes, or preservatives    pregnant or trying to get pregnant    breast-feeding  What should I watch for while using this medicine?  Do not use for more than 2 weeks without advice from your doctor or health care professional. It can take 2 to 4 days to have a bowel movement and to experience improvement in constipation. See your health care professional for any changes in bowel habits, including constipation, that are severe or last longer than three weeks.  Always take this medicine with plenty of water.  NOTE:This sheet is a summary. It may not cover all possible information. If you have questions about this medicine, talk to your doctor, pharmacist, or health care provider. Copyright  2018 [employer]

## 2018-11-10 NOTE — PHARMACY-ADMISSION MEDICATION HISTORY
Admission medication history interview status for this patient is complete. See [facility] admission navigator for allergy information, prior to admission medications and immunization status.     Medication history interview source(s):Patient  Medication history resources (including written lists, pill bottles, clinic record): Verified oral contraceptive w/ [facility], Pharmacy admission note from 11/4/2018  [facility]: [facility]    Changes made to PTA medication list:  Added: none  Deleted: none  Changed: none    Actions taken by pharmacist (provider contacted, etc):None     Additional medication history information:None    Medication reconciliation/reorder completed by provider prior to medication history? Yes    Do you take OTC medications (eg tylenol, ibuprofen, fish oil, eye/ear drops, etc)? Y(Y/N)    For patients on insulin therapy: N (Y/N)      Prior to Admission medications    Medication Sig Last Dose Taking? Auth Provider   acetaminophen (TYLENOL) 325 MG tablet Take 650 mg by mouth every 6 hours as needed for mild pain Past Month at Unknown time Yes Unknown, Entered By History   amoxicillin-clavulanate (AUGMENTIN) 875-125 MG per tablet Take 1 tablet by mouth 2 times daily 11/9/2018 at am Yes Henry Aguilera MD   desogestrel-ethinyl estradiol (JULEBER) 0.15-30 MG-MCG per tablet Take 1 tablet by mouth daily 11/9/2018 at am Yes Unknown, Entered By History   hydrOXYzine (ATARAX) 25 MG tablet Take 25-50 mg by mouth 3 times daily as needed for itching Past Month at Unknown time Yes Unknown, Entered By History   ibuprofen (ADVIL/MOTRIN) 200 MG tablet Take 1-2 tablets (200-400 mg) by mouth every 4 hours as needed for moderate pain Past Month at Unknown time Yes Henry Aguilera MD   medical cannabis (Patient's own supply.  Not a prescription) 1 Dose See Admin Instructions (This is NOT a prescription, and does not certify that the patient has a qualifying medical condition for medical  cannabis.  The purpose of this order is  to document that the patient reports taking medical cannabis.) Past Week at Unknown time Yes Reported, Patient   ondansetron (ZOFRAN-ODT) 4 MG ODT tab Take 4 mg by mouth every 8 hours as needed for nausea Past Month at Unknown time Yes Unknown, Entered By History   oxyCODONE IR (ROXICODONE) 5 MG tablet Take 1-2 tablets (5-10 mg) by mouth every 6 hours as needed for moderate to severe pain Past Month at Unknown time Yes Henry Aguilera MD   prochlorperazine (COMPAZINE) 10 MG tablet Take 10 mg by mouth every 6 hours as needed for nausea or vomiting Past Month at Unknown time Yes Unknown, Entered By History   TIZANIDINE HCL PO Take 4 mg by mouth 6 times daily  11/9/2018 at Unknown time Yes Reported, Patient   venlafaxine (EFFEXOR-XR) 37.5 MG 24 hr capsule Take 112.5 mg by mouth daily 11/9/2018 at am Yes Unknown, Entered By History

## 2018-11-10 NOTE — CONSULTS
Chief complaint:  Nausea and vomiting    HPI:  This patient is a 33 year old female who underwent a laparoscopic cholecystectomy just over two weeks ago by Dr. Coyne.  Since that time, she has had some problems with nausea and vomiting.  She was recently seen at an outside [facility] for these symptoms, and was then transferred here.  Patient does have a history of gastric bypass.  She has not seen a bariatric surgeon regarding her symptoms.  She is now feeling a bit better and is taking some clear liquids.  The patient was hospitalized about a week ago and was found to have a fluid collection in her gallbladder fossa.  This was treated as an infected seroma.  She was sent home on oral antibiotics.    Past Medical History:   has a past medical history of Anxiety; Depressive disorder; Edinson-Danlos syndrome; Migraines; Other chronic pain; and Panic disorder.    Past Surgical History:  Past Surgical History:   Procedure Laterality Date     ABDOMEN SURGERY      bowel resection     BACK SURGERY      hernia repair low back     GI SURGERY      gastric resection     HAND SURGERY       HERNIA REPAIR       LAPAROSCOPIC CHOLECYSTECTOMY N/A 10/24/2018    Procedure: LAPAROSCOPIC CHOLECYSTECTOMY;  Surgeon: Beba Coyne MD;  Location: [facility] OR     ORTHOPEDIC SURGERY      rt hand surgery     TONSILLECTOMY          Social History:  Social History     Social History     Marital status: Single     Spouse name: N/A     Number of children: N/A     Years of education: N/A     Occupational History     Not on file.     Social History Main Topics     Smoking status: Never Smoker     Smokeless tobacco: Never Used     Alcohol use Yes      Comment: rarely      Drug use: 3.00 per week     Special: Marijuana      Comment: pt uses marijuana 2-3x/week     Sexual activity: Not on file     Other Topics Concern     Not on file     Social History Narrative        Family History:  Family History   Problem Relation Age of Onset      Hypertension Mother        Review of Systems:  The 10 point Review of Systems is negative other than noted in the HPI and above.    Physical Exam:  General - This is a well developed, well nourished female in no apparent distress.  HEENT - Normocephalic, atraumatic.  No scleral icterus.  Neck - supple without masses  Lungs - clear to ascultation.    Heart - Regular rate & rhythm without murmur  Abdomen:   soft, non-distended and nontender.  Extremities - warm without edema  Neurologic - nonfocal    Relevant labs:  Liver function tests are normal.  White blood cell count is 13,000.  Hemoglobin is 9.5.    Imaging:  CT scan shows a smaller fluid collection near the gallbladder fossa.  This was previously treated as a small infected seroma.  Comment is made of a dilated loop of bowel in the left abdomen.  This was interpreted as an ileus, but it is at the site of the Miles-en-Y anastomosis.  These are typically quite prominent.    Assessment and Plan:  This is a patient with nausea and vomiting and somewhat dilated loop of bowel which likely is normal after gastric bypass.  Patient does have an elevated white blood cell count.  It is possible that she still has a small abscess, but this seems to be improving significantly.  I would agree with continuing antibiotics and starting a diet as tolerated.  I have suggested that if the patient has continued problems with nausea, that it might be worth meeting with a bariatric surgeon at some point.  We will follow patient with you.      Feng Arias MD  [facility]

## 2018-11-10 NOTE — PLAN OF CARE
"Problem: Pain, Acute (Adult)  Goal: Identify Related Risk Factors and Signs and Symptoms  Related risk factors and signs and symptoms are identified upon initiation of [employer] Clinical Practice Guideline (CPG).  Outcome: No Change  PRIMARY DIAGNOSIS: \"GENERIC\" NURSING  OUTPATIENT/OBSERVATION GOALS TO BE MET BEFORE DISCHARGE:  1. ADLs back to baseline: Yes    2. Activity and level of assistance: Ambulating independently.    3. Pain status: Improved but still requiring IV narcotics.    4. Return to near baseline physical activity: Yes     Discharge Planner Nurse   Safe discharge environment identified: Yes  Barriers to discharge: Yes- still awaiting surgery consult to see patient; requiring IV pain medications, still NPO with ice chips        Entered by: Aleshia Martin 11/10/2018 10:42 AM     Please review provider order for any additional goals.   Nurse to notify provider when observation goals have been met and patient is ready for discharge.      "

## 2018-11-10 NOTE — PROGRESS NOTES
Brief update.  Please see H&P from Dr. Engle earlier this morning for further detail.  Agree with plan set by Dr. Engle.    Direct admit to observation for abdominal pain, nausea, vomiting with recent cholecystectomy 10/24/18.  Feels a little better this morning but still has 6 out of 10 abdominal pain mostly in the right upper quadrant.  Some nausea but requests ice and water.  Mild right upper quadrant tenderness on exam.  CT showing small fluid collection at the gallbladder fossa, more likely seroma or postoperative fluid collection as opposed to abscess.  Although she does have slight leukocytosis.  Did have elevated lactic acid as well and even higher white blood cell count at the outside facility.  -Await general surgery recommendations  -Allow ice chips and some water per her request, hold on advancement case of ileus  -IV fluids  -Low-dose IV Dilaudid as needed  -Zofran  -Resume her home oral contraceptive pill tomorrow when she is due for next dose  -Continue Augmentin for now plan to complete the full 10-day course initially prescribed

## 2018-11-10 NOTE — H&P
[facility]    History and Physical  Hospitalist       Date of Admission:  11/10/2018    Chief Complaint  : Nausea, vomiting    History of Present Illness   33yoF with history of Edinson Danlos syndrome, migraine, asthma, depression, anxiety, fibromyalgia on medical cannabis, morbid obesity s/p gastric bypass, small bowel resection and herniorrhaphy who presents as a transfer from [facility] for evaluation of nausea and vomiting.  On 10/24/18 she underwent laparoscopic cholecystectomy by Dr. Coyne for chonic cholecystitis.  She then had an outpatient CT on 11/4/18 showed a small fluid collection in the gallbladder fossa prompting visit to the [facility] ED.  HIDA scan performed at that did not show bile leak, so patient was thought to have post-operative seroma.  She was discharged 11/5/18 on Augmentin for possible infected seroma.  Patient presented to [facility] overnight for evaluation of abdominal pain and nausea, and CT again performed there showed post-op seroma which is decreasing in size, but also showed concern for ileus or partial SBO, with bowel distention proximal to her bypass anastomosis.  No beds were available, so arrangements were made to transfer to [facility] in light of recent surgery and admission.  Patient was resting comfortably at time of evaluation.  She states that she has been having paroxysmal pain that is similar to pain she had prior to cholecystectomy, but she also has new pain at her trocar sites as well.  She has not vomited since arrival at [facility].  Could barely stay awake during interview and in no acute distress.        Assessment & Plan     1) Abdominal pain:  - Doubt that findings on recent CT represent true obstruction  - Complicated by history of fibromyalgia/pain disorder  - NSAID relatively contraindicated after bypass, was taking ibuprofen  - Medical cannabis likely exacerbating nausea, she is a poor candidate for this  - NPO/IVF pending surgical  evaluation, hold antibiotics for now  - Zofran PRN nausea, empiric PPI for gastritis? Marginal ulcer?  - Low dose IV dilaudid since NPO  - Check CMP, CBC, lipase, Mg    2) Depression: Venlafaxine    2) Pharmacy: Needs Rx reconciliation verified      DVT Prophylaxis: Pneumatic Compression Devices  Code Status: Full Code  Expected discharge: Tomorrow, recommended to prior living arrangement once antibiotic plan established.    Mario Engle MD    History is obtained from the patient    Primary Care Physician   Ron Nicholas    Past Medical History    I have reviewed this patient's medical history and updated it with pertinent information if needed.   Past Medical History:   Diagnosis Date     Anxiety      Depressive disorder      Edinson-Danlos syndrome      Migraines      Other chronic pain     Edinson danlos syndrome     Panic disorder        Past Surgical History   I have reviewed this patient's surgical history and updated it with pertinent information if needed.  Past Surgical History:   Procedure Laterality Date     ABDOMEN SURGERY      bowel resection     BACK SURGERY      hernia repair low back     GI SURGERY      gastric resection     HAND SURGERY       HERNIA REPAIR       LAPAROSCOPIC CHOLECYSTECTOMY N/A 10/24/2018    Procedure: LAPAROSCOPIC CHOLECYSTECTOMY;  Surgeon: Beba Conye MD;  Location: [facility] OR     ORTHOPEDIC SURGERY      rt hand surgery     TONSILLECTOMY         Prior to Admission Medications   Prior to Admission Medications   Prescriptions Last Dose Informant Patient Reported? Taking?   TIZANIDINE HCL PO   Yes No   Sig: Take 4 mg by mouth 6 times daily    acetaminophen (TYLENOL) 325 MG tablet  Self Yes No   Sig: Take 650 mg by mouth every 6 hours as needed for mild pain   amoxicillin-clavulanate (AUGMENTIN) 875-125 MG per tablet   No No   Sig: Take 1 tablet by mouth 2 times daily   desogestrel-ethinyl estradiol (JULEBER) 0.15-30 MG-MCG per tablet  Self Yes No   Sig: Take 1 tablet by mouth  daily   hydrOXYzine (ATARAX) 25 MG tablet  Self Yes No   Sig: Take 25-50 mg by mouth 3 times daily as needed for itching   ibuprofen (ADVIL/MOTRIN) 200 MG tablet   No No   Sig: Take 1-2 tablets (200-400 mg) by mouth every 4 hours as needed for moderate pain   medical cannabis (Patient's own supply.  Not a prescription)   Yes No   Si Dose See Admin Instructions (This is NOT a prescription, and does not certify that the patient has a qualifying medical condition for medical cannabis.  The purpose of this order is  to document that the patient reports taking medical cannabis.)   ondansetron (ZOFRAN-ODT) 4 MG ODT tab  Self Yes No   Sig: Take 4 mg by mouth every 8 hours as needed for nausea   oxyCODONE IR (ROXICODONE) 5 MG tablet   No No   Sig: Take 1-2 tablets (5-10 mg) by mouth every 6 hours as needed for moderate to severe pain   prochlorperazine (COMPAZINE) 10 MG tablet  Self Yes No   Sig: Take 10 mg by mouth every 6 hours as needed for nausea or vomiting   venlafaxine (EFFEXOR-XR) 37.5 MG 24 hr capsule  Self Yes No   Sig: Take 112.5 mg by mouth daily      Facility-Administered Medications: None     Allergies   No Known Allergies    Social History   I have reviewed this patient's social history and updated it with pertinent information if needed. Sabina Herron  reports that she has never smoked. She has never used smokeless tobacco. She reports that she drinks alcohol. She reports that she uses illicit drugs, including Marijuana, about 3 times per week.    Family History   I have reviewed this patient's family history and updated it with pertinent information if needed.   Family History   Problem Relation Age of Onset     Hypertension Mother        Review of Systems   The 10 point Review of Systems is negative other than noted in the HPI or here.     Physical Exam   Temp: 98  F (36.7  C) Temp src: Oral BP: 170/83 Pulse: 84   Resp: 18 SpO2: 97 % O2 Device: None (Room air)    Vital Signs with Ranges  Temp:  [98   F (36.7  C)] 98  F (36.7  C)  Pulse:  [84] 84  Resp:  [18] 18  BP: (170)/(83) 170/83  SpO2:  [97 %] 97 %  198 lbs 8 oz    Constitutional: NAD, AAox3  Eyes: PERRLA  HEENT: NCAT  Respiratory: CTABL, non-labored  Cardiovascular: Regular, no MRG  GI: Soft, obese, NT.ND  Musculoskeletal: No weakness or deformity  Neurologic: Lethargic, no focal deficits      Data   Data reviewed today:  I personally reviewed no images or EKG's today.    Recent Labs  Lab 11/05/18  0608 11/04/18  0813 11/04/18  0705   WBC 12.7*  --  19.3*   HGB 10.1*  --  11.9   MCV 81  --  83   *  --  848*    138  --    POTASSIUM 4.1 4.3  --    CHLORIDE 108 107  --    CO2 23 16*  --    BUN 5* 6*  --    CR 0.46* 0.43*  --    ANIONGAP 7 15*  --    CAN 8.2* 8.7  --    * 100*  --    ALBUMIN 2.9* 3.7  --    PROTTOTAL 6.4* 7.9  --    BILITOTAL 0.2 0.3  --    ALKPHOS 248* 338*  --    ALT 30 45  --    AST 25 49*  --    LIPASE 117  --   --        No results found for this or any previous visit (from the past 24 hour(s)).

## 2018-11-10 NOTE — IP AVS SNAPSHOT
[place] [address] [facility]    [address]    [facility] [address]    Phone:  [phone]    Fax:  [phone]                                       After Visit Summary   11/10/2018    Sabina Herron    MRN: [id number]           After Visit Summary Signature Page     I have received my discharge instructions, and my questions have been answered. I have discussed any challenges I see with this plan with the nurse or doctor.    ..........................................................................................................................................  Patient/Patient Representative Signature      ..........................................................................................................................................  Patient Representative Print Name and Relationship to Patient    ..................................................               ................................................  Date                                   Time    ..........................................................................................................................................  Reviewed by Signature/Title    ...................................................              ..............................................  Date                                               Time          22EPIC Rev 08/18

## 2018-11-11 VITALS
WEIGHT: 198.5 LBS | HEIGHT: 63 IN | DIASTOLIC BLOOD PRESSURE: 64 MMHG | TEMPERATURE: 97.4 F | BODY MASS INDEX: 35.17 KG/M2 | HEART RATE: 64 BPM | SYSTOLIC BLOOD PRESSURE: 103 MMHG | RESPIRATION RATE: 16 BRPM | OXYGEN SATURATION: 96 %

## 2018-11-11 LAB
ERYTHROCYTE [DISTWIDTH] IN BLOOD BY AUTOMATED COUNT: 20.7 % (ref 10–15)
HCT VFR BLD AUTO: 31.6 % (ref 35–47)
HGB BLD-MCNC: 9.3 G/DL (ref 11.7–15.7)
MCH RBC QN AUTO: 25.1 PG (ref 26.5–33)
MCHC RBC AUTO-ENTMCNC: 29.4 G/DL (ref 31.5–36.5)
MCV RBC AUTO: 85 FL (ref 78–100)
PLATELET # BLD AUTO: 580 10E9/L (ref 150–450)
RBC # BLD AUTO: 3.7 10E12/L (ref 3.8–5.2)
WBC # BLD AUTO: 10.6 10E9/L (ref 4–11)

## 2018-11-11 PROCEDURE — G0378 HOSPITAL OBSERVATION PER HR: HCPCS

## 2018-11-11 PROCEDURE — 99217 ZZC OBSERVATION CARE DISCHARGE: CPT | Performed by: INTERNAL MEDICINE

## 2018-11-11 PROCEDURE — 99231 SBSQ HOSP IP/OBS SF/LOW 25: CPT | Mod: 24 | Performed by: SURGERY

## 2018-11-11 PROCEDURE — A9270 NON-COVERED ITEM OR SERVICE: HCPCS | Mod: GY | Performed by: INTERNAL MEDICINE

## 2018-11-11 PROCEDURE — 85027 COMPLETE CBC AUTOMATED: CPT | Performed by: INTERNAL MEDICINE

## 2018-11-11 PROCEDURE — 36415 COLL VENOUS BLD VENIPUNCTURE: CPT | Performed by: INTERNAL MEDICINE

## 2018-11-11 PROCEDURE — 25000132 ZZH RX MED GY IP 250 OP 250 PS 637: Mod: GY | Performed by: INTERNAL MEDICINE

## 2018-11-11 PROCEDURE — 96376 TX/PRO/DX INJ SAME DRUG ADON: CPT

## 2018-11-11 PROCEDURE — 96375 TX/PRO/DX INJ NEW DRUG ADDON: CPT

## 2018-11-11 PROCEDURE — 99207 ZZC CDG-CODE CATEGORY CHANGED: CPT | Performed by: INTERNAL MEDICINE

## 2018-11-11 PROCEDURE — 25000128 H RX IP 250 OP 636: Performed by: INTERNAL MEDICINE

## 2018-11-11 RX ORDER — OXYCODONE HYDROCHLORIDE 5 MG/1
5-10 TABLET ORAL EVERY 4 HOURS PRN
Qty: 10 TABLET | Refills: 0 | Status: SHIPPED | OUTPATIENT
Start: 2018-11-11

## 2018-11-11 RX ORDER — OXYCODONE HYDROCHLORIDE 5 MG/1
5-10 TABLET ORAL EVERY 4 HOURS PRN
Status: DISCONTINUED | OUTPATIENT
Start: 2018-11-11 | End: 2018-11-11 | Stop reason: HOSPADM

## 2018-11-11 RX ORDER — DESOGESTREL AND ETHINYL ESTRADIOL 0.15-0.03
1 KIT ORAL DAILY
Status: DISCONTINUED | OUTPATIENT
Start: 2018-11-11 | End: 2018-11-11 | Stop reason: HOSPADM

## 2018-11-11 RX ORDER — POLYETHYLENE GLYCOL 3350 17 G/17G
17 POWDER, FOR SOLUTION ORAL DAILY PRN
Status: DISCONTINUED | OUTPATIENT
Start: 2018-11-11 | End: 2018-11-11 | Stop reason: HOSPADM

## 2018-11-11 RX ADMIN — VENLAFAXINE HYDROCHLORIDE 112.5 MG: 75 CAPSULE, EXTENDED RELEASE ORAL at 08:36

## 2018-11-11 RX ADMIN — POLYETHYLENE GLYCOL 3350 17 G: 17 POWDER, FOR SOLUTION ORAL at 10:57

## 2018-11-11 RX ADMIN — OXYCODONE HYDROCHLORIDE 5 MG: 5 TABLET ORAL at 08:37

## 2018-11-11 RX ADMIN — Medication 0.2 MG: at 03:21

## 2018-11-11 RX ADMIN — ONDANSETRON 4 MG: 2 INJECTION INTRAMUSCULAR; INTRAVENOUS at 13:22

## 2018-11-11 RX ADMIN — AMOXICILLIN AND CLAVULANATE POTASSIUM 1 TABLET: 875; 125 TABLET, FILM COATED ORAL at 08:36

## 2018-11-11 RX ADMIN — OXYCODONE HYDROCHLORIDE 5 MG: 5 TABLET ORAL at 12:42

## 2018-11-11 ASSESSMENT — PAIN DESCRIPTION - DESCRIPTORS: DESCRIPTORS: SHARP

## 2018-11-11 NOTE — PLAN OF CARE
"Problem: Patient Care Overview  Goal: Plan of Care/Patient Progress Review  Outcome: Improving  PRIMARY DIAGNOSIS: \"GENERIC\" NURSING  OUTPATIENT/OBSERVATION GOALS TO BE MET BEFORE DISCHARGE:  1. ADLs back to baseline: Yes    2. Activity and level of assistance: Ambulating independently.    3. Pain status: Improved but still requiring IV narcotics.    4. Return to near baseline physical activity: Yes     Discharge Planner Nurse   Safe discharge environment identified: Yes  Barriers to discharge: Yes, still requiring IV pain meds       Entered by: Val Medina 11/11/2018 4:01 AM     Please review provider order for any additional goals.   Nurse to notify provider when observation goals have been met and patient is ready for discharge.      "

## 2018-11-11 NOTE — PLAN OF CARE
"Problem: Patient Care Overview  Goal: Plan of Care/Patient Progress Review  Outcome: Improving  PRIMARY DIAGNOSIS: \"GENERIC\" NURSING  OUTPATIENT/OBSERVATION GOALS TO BE MET BEFORE DISCHARGE:  1. ADLs back to baseline: Yes    2. Activity and level of assistance: Ambulating independently.    3. Pain status: Improved but still requiring IV narcotics.    4. Return to near baseline physical activity: Yes     Discharge Planner Nurse   Safe discharge environment identified: Yes  Barriers to discharge: Yes- IV dilaudid for pain        Entered by: Aleshia Martin 11/10/2018 6:23 PM     Please review provider order for any additional goals.   Nurse to notify provider when observation goals have been met and patient is ready for discharge.    Ambulatory Status:  Pt up ad sherri.  VSS  Pain:  Abdominal pain consistently reported to be at a 6/10, 7/10, IV dilaudid given x 3 with very slight relief   Resp: LS clear, on RA  GI:  Denies nausea, no zofran needed or given. Tolerating clears with no increased pain or nausea; eager for full liquids.  BS active.  Passing flatus.  Last BM 11/8.  :  WDL  Skin:  Bruising   Tx:  PO Augmentin, pain control   Labs:  Creat 0.46, WBC 13, hgb 9.5, plt 612  Consults:  Surgery  Disposition:  TBD, hopeful discharge 11/11        "

## 2018-11-11 NOTE — DISCHARGE SUMMARY
[facility]  Discharge Summary  Name: Sabina Herron    MRN: [id number]  YOB: 1985    Age: 33 year old  Date of Discharge:  11/11/2018  Date of Admission: 11/10/2018  Primary Care Provider: Ron Nicholas  Discharge Physician:  Magdiel Valdovinos MD  Discharging Service:  Hospitalist      Discharge Diagnoses:  Postoperative abdominal pain, possible infected seroma  Chronic nausea  Chronic pain syndrome on medical cannabis  History gastric bypass surgery  MDD  Anxiety  Edinson-Danlos syndrome  Anemia  Thrombocytosis     Hospital Course:  Summary of Stay: Sabina Herron is a 33 year old female with past medical history of gastric bypass surgery, chronic pain syndrome on medical cannabis, Ehler Danlos syndrome, MDD, anxiety, OCP use, and anemia who was admitted on 11/10/2018 as a direct admit for abdominal pain and nausea.  Possible infected seroma and is on Augmentin.  Leukocytosis now resolved.  Overall size of fluid collection this area decreasing in CT scan at outside facility.  General surgery consulted and did not feel outside CT scan consistent with ileus or small bowel obstruction.  Advancing diet as tolerated.  Observation patient.  Had episode of vomiting today, however she requests discharge.  Small supply oxycodone provided.  Will complete Augmentin course.  Zofran and Compazine at home.  Follow-up with primary care doctor if symptoms not improving within a week.  Also recommended stopping medical cannabis and following up with a bariatric surgeon if nausea persists.     Problem List/Assessment and Plan:   Abdominal pain, nausea, vomiting: Cholecystectomy on 10/24/18 by Dr. Coyne for possible chronic cholecystitis.  Outpatient CT on 11/4 showing small fluid collection in the gallbladder fossa so started on Augmentin for possible infected seroma.  HIDA scan did not show any bile leak.  Returns again for ongoing right upper quadrant pain and pain around incision sites.  Also  persistent nausea that actually has been present for the past 1-2 months.  CT at outside facility shows decrease in size of the fluid in the gallbladder fossa.  Also comment of mild distention of small bowel and possible ileus.  This is at the anastomosis site from her previous gastric bypass and not felt to be bowel obstruction per surgery.  She is passing flatus but no bowel movement in a couple of days.  Had episode of vomiting today, however requests discharge.  -General surgery consulted  -Continue Augmentin to complete course for possible infected seroma.  Leukocytosis resolved  -Small supply oxycodone on discharge, 10 tablets  -Zofran and Compazine as needed, has at home  -Full liquid diet for the next couple of days if nausea persists  -Recommend follow-up with a bariatric surgeon regarding nausea for the past 1-2 months in case related to her previous gastric bypass surgery.  Also recommended she stop taking her medical cannabis to see if this resolves the nausea problem, but she says he is not going to do this as she has in the past and it did not work     History gastric bypass surgery     Chronic pain syndrome: Does not appear to be on opiates chronically other than the prescription she received following cholecystectomy.  Does take tizanidine will continue.  Also on medical cannabis that I recommend holding due to the persistent nausea for the past couple of months.     MDD, anxiety: Continue PTA venlafaxine and as needed tizanidine.     Edinson Downlos syndrome      Oral contraceptive use: Continue     Anemia, thrombocytosis: Hemoglobin 9.3, but appears stable.  Suspect secondary to surgery.  Possible infected seroma as above and this could go along with inflammatory changes and reactive thrombocytosis.     Discharge Disposition:  Discharged to home     Allergies:  No Known Allergies     Discharge Medications:   Current Discharge Medication List      CONTINUE these medications which have CHANGED     "Details   oxyCODONE IR (ROXICODONE) 5 MG tablet Take 1-2 tablets (5-10 mg) by mouth every 4 hours as needed for moderate to severe pain  Qty: 10 tablet, Refills: 0    Associated Diagnoses: Status post cholecystectomy         CONTINUE these medications which have NOT CHANGED    Details   acetaminophen (TYLENOL) 325 MG tablet Take 650 mg by mouth every 6 hours as needed for mild pain      amoxicillin-clavulanate (AUGMENTIN) 875-125 MG per tablet Take 1 tablet by mouth 2 times daily  Qty: 20 tablet, Refills: 0    Associated Diagnoses: Seroma of digestive system after digestive system procedure      desogestrel-ethinyl estradiol (JULEBER) 0.15-30 MG-MCG per tablet Take 1 tablet by mouth daily      hydrOXYzine (ATARAX) 25 MG tablet Take 25-50 mg by mouth 3 times daily as needed for itching      ibuprofen (ADVIL/MOTRIN) 200 MG tablet Take 1-2 tablets (200-400 mg) by mouth every 4 hours as needed for moderate pain    Associated Diagnoses: Pain      medical cannabis (Patient's own supply.  Not a prescription) 1 Dose See Admin Instructions (This is NOT a prescription, and does not certify that the patient has a qualifying medical condition for medical cannabis.  The purpose of this order is  to document that the patient reports taking medical cannabis.)      ondansetron (ZOFRAN-ODT) 4 MG ODT tab Take 4 mg by mouth every 8 hours as needed for nausea      prochlorperazine (COMPAZINE) 10 MG tablet Take 10 mg by mouth every 6 hours as needed for nausea or vomiting      TIZANIDINE HCL PO Take 4 mg by mouth 6 times daily       venlafaxine (EFFEXOR-XR) 37.5 MG 24 hr capsule Take 112.5 mg by mouth daily              Condition on Discharge:  Discharge condition: Stable   Discharge vitals: Blood pressure 103/64, pulse 64, temperature 97.4  F (36.3  C), temperature source Oral, resp. rate 16, height 1.6 m (5' 3\"), weight 90 kg (198 lb 8 oz), last menstrual period 09/01/2017, SpO2 96 %, not currently breastfeeding.   Code status on " "discharge: Full Code     History of Illness:  See detailed admission note for full details.    Physical Exam:  Blood pressure 103/64, pulse 64, temperature 97.4  F (36.3  C), temperature source Oral, resp. rate 16, height 1.6 m (5' 3\"), weight 90 kg (198 lb 8 oz), last menstrual period 09/01/2017, SpO2 96 %, not currently breastfeeding.  Wt Readings from Last 1 Encounters:   11/10/18 90 kg (198 lb 8 oz)        Constitutional: Awake, NAD  Eyes: sclera white  HEENT:MMM  Respiratory:  lungs cta bilaterally, no crackles or wheeze  Cardiovascular: RRR.  No murmur  GI: Soft, mild right upper quadrant tenderness and mild tenderness around incision sites.  No guarding.  Bowel sounds present  Skin: no rash   Musculoskeletal/extremities: No edema  Neurologic: A&O  Psychiatric: calm, cooperative    Procedures other than Imaging:  None     Imaging:  Results for orders placed or performed during the hospital encounter of 11/04/18   NM HepatOBiliary Scan    Narrative    NUCLEAR MEDICINE HEPATOBILIARY SCAN 11/4/2018 11:52 AM.     HISTORY: Status post laparoscopic cholecystectomy. Cholecystectomy  10/24/2018 with abdominal pain nausea and vomiting since surgery  Evaluate for bile leak.    TECHNIQUE : The patient received Contrast: 6.4 mCi Tc99m Mebrofenin IV  at 1035. Images out to 60 minutes.    FINDINGS : Prompt visualization of activity in the common bile duct by  4 minutes with activity in the duodenum by 6 minutes. There is  progressive activity identified in the bowel. No evidence for bile  leak.      Impression    IMPRESSION: No evidence for bile leak. No evidence for duct  obstruction.    HALLIE MCDONNELL MD        Consultations:  Consultation during this admission received from surgery.       Recent Lab Results:    Recent Labs  Lab 11/11/18  0630 11/10/18  0755 11/05/18  0608   WBC 10.6 13.0* 12.7*   HGB 9.3* 9.5* 10.1*   HCT 31.6* 31.0* 33.0*   MCV 85 84 81   * 612* 561*       Recent Labs  Lab 11/10/18  0755 " 11/05/18  0608    138   POTASSIUM 3.7 4.1   CHLORIDE 106 108   CO2 26 23   ANIONGAP 8 7   * 116*   BUN 4* 5*   CR 0.46* 0.46*   GFRESTIMATED >90 >90   GFRESTBLACK >90 >90   CAN 7.9* 8.2*   MAG 1.9  --    PROTTOTAL 6.6* 6.4*   ALBUMIN 3.1* 2.9*   BILITOTAL 0.4 0.2   ALKPHOS 148 248*   AST 21 25   ALT 27 30          Pending Results:    Unresulted Labs Ordered in the Past 30 Days of this Admission     No orders found for last 61 day(s).         These results will be followed up by patient's primary care provider.    Discharge Instructions and Follow-Up:     Discharge Procedure Orders  Reason for your hospital stay   Order Comments: You were admitted for abdominal pain and nausea.  Your Augmentin antibiotic is been continued and your white blood cell count is now normal.  General surgery did not feel there is any surgical need for drainage of this fluid.  Recommend you discontinue cannabis for now and follow-up with a bariatric surgeon regarding persistent nausea.     Follow-up and recommended labs and tests    Order Comments: Follow up with primary care provider, Ron Nicholas, within 7 days if symptoms not improving.    Follow-up with a bariatric surgeon if persistent nausea     Activity   Order Comments: Your activity upon discharge: activity as tolerated   Order Specific Question Answer Comments   Is discharge order? Yes      Full Code   Order Specific Question Answer Comments   Code status determined by: Discussion with patient/legal decision maker      Diet   Order Comments: Follow this diet upon discharge: Recommend sticking to full liquid diet for the next couple of days until nausea improving then advance to low fiber diet   Order Specific Question Answer Comments   Is discharge order? Yes            Recommendations:  -Finish Augmentin course  -Small supply of oxycodone 10 tablets provided for ongoing abdominal pain.  -Stop medical cannabis  -Follow-up with primary doctor within a week if not  improving  -Recommend following with a bariatric surgeon if persistent nausea  -Full liquid diet until abdominal pain and nausea improving      Magdiel JUÁREZ, personally saw the patient today and spent greater than 30 minutes discharging this patient.    Magdiel Valdovinos MD

## 2018-11-11 NOTE — PLAN OF CARE
"Problem: Patient Care Overview  Goal: Plan of Care/Patient Progress Review  Outcome: Improving  PRIMARY DIAGNOSIS: \"GENERIC\" NURSING  OUTPATIENT/OBSERVATION GOALS TO BE MET BEFORE DISCHARGE:  1. ADLs back to baseline: Yes    2. Activity and level of assistance: Ambulating independently.    3. Pain status: Improved but still requiring IV narcotics.    4. Return to near baseline physical activity: Yes     Discharge Planner Nurse   Safe discharge environment identified: Yes  Barriers to discharge: Yes, still requires IV pain meds       Entered by: Val Medina 11/11/2018 4:02 AM     Please review provider order for any additional goals.   Nurse to notify provider when observation goals have been met and patient is ready for discharge.      "

## 2018-11-11 NOTE — PLAN OF CARE
"Problem: Patient Care Overview  Goal: Plan of Care/Patient Progress Review  Outcome: Improving  PRIMARY DIAGNOSIS: \"GENERIC\" NURSING  OUTPATIENT/OBSERVATION GOALS TO BE MET BEFORE DISCHARGE:  1. ADLs back to baseline: Yes    2. Activity and level of assistance: Ambulating independently.    3. Pain status: Improved-controlled with oral pain medications.    4. Return to near baseline physical activity: Yes     Discharge Planner Nurse   Safe discharge environment identified: Yes  Barriers to discharge: Yes- pt needs to tolerate full liquid diet as well as low fiber diet before discharge        Entered by: Aleshia Martin 11/11/2018 9:53 AM     Please review provider order for any additional goals.   Nurse to notify provider when observation goals have been met and patient is ready for discharge.      "

## 2018-11-11 NOTE — PROGRESS NOTES
[facility]  Hospitalist Progress Note  Magdiel Valdovinos MD 11/11/18    Reason for Stay (Diagnosis): Abdominal pain, nausea         Assessment and Plan:      Summary of Stay: Sabina Herron is a 33 year old female with past medical history of gastric bypass surgery, chronic pain syndrome on medical cannabis, Ehler Danlos syndrome, MDD, anxiety, OCP use, and anemia who was admitted on 11/10/2018 as a direct admit for abdominal pain and nausea.  Possible infected seroma and is on Augmentin.  Leukocytosis now resolved.  Overall size of fluid collection this area decreasing in CT scan at outside facility.  General surgery consulted and did not feel outside CT scan consistent with ileus or small bowel obstruction.  Advancing diet as tolerated.  Observation patient.    Problem List/Assessment and Plan:   Abdominal pain, nausea, vomiting: Cholecystectomy on 10/24/18 by Dr. Coyne for possible chronic cholecystitis.  Outpatient CT on 11/4 showing small fluid collection in the gallbladder fossa so started on Augmentin for possible infected seroma.  HIDA scan did not show any bile leak.  Returns again for ongoing right upper quadrant pain and pain around incision sites.  Also persistent nausea that actually has been present for the past 1-2 months.  CT at outside facility shows decrease in size of the fluid in the gallbladder fossa.  Also comment of mild distention of small bowel and possible ileus.  This is at the anastomosis site from her previous gastric bypass and not felt to be bowel obstruction per surgery.  She is passing flatus but no bowel movement in a couple of days.  -General surgery consulted  -Continue Augmentin to complete course for possible infected seroma.  Leukocytosis resolved today  -Change IV Dilaudid to oral oxycodone as needed  -Zofran and Compazine as needed  -Advance diet as tolerated low fiber diet  -Recommend follow-up with a bariatric surgeon regarding nausea for the past 1-2  "months in case related to her previous gastric bypass surgery.  Also recommended she stop taking her medical cannabis to see if this resolves the nausea problem, but she says he is not going to do this as she has in the past and it did not work    History gastric bypass surgery    Chronic pain syndrome: Does not appear to be on opiates chronically other than the prescription she received following cholecystectomy.  Does take tizanidine will continue.  Also on medical cannabis that I recommend holding due to the persistent nausea for the past couple of months.    MDD, anxiety: Continue PTA venlafaxine and as needed tizanidine.    Edinson Downlos syndrome     Oral contraceptive use: Continue    Anemia, thrombocytosis: Hemoglobin 9.3, but appears stable.  Suspect secondary to surgery.  Possible infected seroma as above and this could go along with inflammatory changes and reactive thrombocytosis.  -CBC tomorrow if still here    DVT Prophylaxis: Ambulate every shift  Code Status: Full Code  FEN: Advance diet as tolerated to low fiber diet, TKO IV fluids when good p.o.  Discharge Dispo: Home  Estimated Disch Date / # of Days until Disch: Current observation patient.  If tolerating low fiber diet later this afternoon a discharge, otherwise hopefully tomorrow        Interval History (Subjective):      Did not have any nausea last night or this morning.  Some nausea this afternoon after eating.  Overall pain improving.  Still having moderate pain in the right upper quadrant mostly and around her incision sites.  No fevers or chills.  Is hopeful of going home later today if she can advance diet.  Passing flatus, but no bowel movement                  Physical Exam:      Last Vital Signs:  /64 (BP Location: Right arm)  Pulse 64  Temp 97.4  F (36.3  C) (Oral)  Resp 16  Ht 1.6 m (5' 3\")  Wt 90 kg (198 lb 8 oz)  LMP 09/01/2017 (Approximate)  SpO2 96%  BMI 35.16 kg/m2      Intake/Output Summary (Last 24 hours) at " 11/11/18 1328  Last data filed at 11/10/18 2016   Gross per 24 hour   Intake             1200 ml   Output                0 ml   Net             1200 ml       Constitutional: Awake, NAD  Eyes: sclera white  HEENT:MMM  Respiratory:  lungs cta bilaterally, no crackles or wheeze  Cardiovascular: RRR.  No murmur  GI: Soft, mild right upper quadrant tenderness and mild tenderness around incision sites.  No guarding.  Bowel sounds present  Skin: no rash   Musculoskeletal/extremities: No edema  Neurologic: A&O  Psychiatric: calm, cooperative         Medications:      All current medications were reviewed with changes reflected in problem list.         Data:      All new lab and imaging data was reviewed.   Labs:    Recent Labs  Lab 11/11/18  0630 11/10/18  0755 11/05/18  0608   WBC 10.6 13.0* 12.7*   HGB 9.3* 9.5* 10.1*   HCT 31.6* 31.0* 33.0*   MCV 85 84 81   * 612* 561*      Imaging:   None today      Magdiel Valdovinos MD

## 2018-11-11 NOTE — PROGRESS NOTES
"Patient complains of pain \"at incisions\" and indicates RUQ.  Says she wants to discharge.    Afebrile.    Abdomen soft.  Mild diffuse tenderness extending up above rib margin on the right.    I suspect this discomfort is more related to fibromyalgia than surgery.  Agree with advancing diet and discharge when able.    Feng Arias MD  [facility]    "

## 2018-11-11 NOTE — PLAN OF CARE
Problem: Patient Care Overview  Goal: Plan of Care/Patient Progress Review  Outcome: Improving  Alert and oriented. Still requiring IV dilaudid for pain. Up in room independently. Possible discharge today.

## 2018-11-11 NOTE — PROGRESS NOTES
"Pt to D/C to home.  Pt provided with d/c instructions, including new medications, when medications were last given, and when to take them again.  Pt also informed to f/u with PCP and bariatric surgeon in 7 days.  Pt verbalized understanding of all d/c and f/u instructions.  All questions were answered at this time.  Copy of paperwork sent with pt.  Medication(oxycodone) x 1 sent with pt.  S.O. to provide transport.  All personal belongings sent with pt.     Flu Vaccine: Declined      PRIMARY DIAGNOSIS: \"GENERIC\" NURSING  OUTPATIENT/OBSERVATION GOALS TO BE MET BEFORE DISCHARGE:  1. ADLs back to baseline: Yes    2. Activity and level of assistance: Ambulating independently.    3. Pain status: Improved-controlled with oral pain medications.    4. Return to near baseline physical activity: Yes     Discharge Planner Nurse   Safe discharge environment identified: Yes  Barriers to discharge: No; pt signed out at 14:50- awaiting ride       Entered by: Aleshia Martin 11/11/2018 2:50 PM     Please review provider order for any additional goals.   Nurse to notify provider when observation goals have been met and patient is ready for discharge.  "

## 2021-09-23 ENCOUNTER — HOSPITAL ENCOUNTER (OUTPATIENT)
Facility: CLINIC | Age: 36
Discharge: HOME | End: 2021-09-23
Attending: STUDENT IN AN ORGANIZED HEALTH CARE EDUCATION/TRAINING PROGRAM | Admitting: STUDENT IN AN ORGANIZED HEALTH CARE EDUCATION/TRAINING PROGRAM
Payer: MEDICARE

## 2021-09-23 ENCOUNTER — HOSPITAL ENCOUNTER (OUTPATIENT)
Facility: CLINIC | Age: 36
End: 2021-09-23
Admitting: STUDENT IN AN ORGANIZED HEALTH CARE EDUCATION/TRAINING PROGRAM
Payer: MEDICARE

## 2021-09-23 VITALS
RESPIRATION RATE: 26 BRPM | DIASTOLIC BLOOD PRESSURE: 101 MMHG | OXYGEN SATURATION: 99 % | SYSTOLIC BLOOD PRESSURE: 144 MMHG | TEMPERATURE: 98.1 F

## 2021-09-23 DIAGNOSIS — M62.838 MUSCLE SPASM: Primary | ICD-10-CM

## 2021-09-23 PROCEDURE — 250N000013 HC RX MED GY IP 250 OP 250 PS 637: Performed by: STUDENT IN AN ORGANIZED HEALTH CARE EDUCATION/TRAINING PROGRAM

## 2021-09-23 PROCEDURE — G0463 HOSPITAL OUTPT CLINIC VISIT: HCPCS

## 2021-09-23 PROCEDURE — 96374 THER/PROPH/DIAG INJ IV PUSH: CPT

## 2021-09-23 PROCEDURE — 258N000003 HC RX IP 258 OP 636: Performed by: STUDENT IN AN ORGANIZED HEALTH CARE EDUCATION/TRAINING PROGRAM

## 2021-09-23 PROCEDURE — 96360 HYDRATION IV INFUSION INIT: CPT

## 2021-09-23 PROCEDURE — 250N000011 HC RX IP 250 OP 636: Performed by: STUDENT IN AN ORGANIZED HEALTH CARE EDUCATION/TRAINING PROGRAM

## 2021-09-23 PROCEDURE — 96375 TX/PRO/DX INJ NEW DRUG ADDON: CPT

## 2021-09-23 PROCEDURE — 96361 HYDRATE IV INFUSION ADD-ON: CPT

## 2021-09-23 PROCEDURE — 999N000105 HC STATISTIC NO DOCUMENTATION TO SUPPORT CHARGE

## 2021-09-23 RX ORDER — PROCHLORPERAZINE MALEATE 10 MG
10 TABLET ORAL EVERY 6 HOURS PRN
Status: DISCONTINUED | OUTPATIENT
Start: 2021-09-23 | End: 2021-09-23 | Stop reason: HOSPADM

## 2021-09-23 RX ORDER — DIPHENHYDRAMINE HCL 25 MG
25 CAPSULE ORAL EVERY 6 HOURS PRN
Status: DISCONTINUED | OUTPATIENT
Start: 2021-09-23 | End: 2021-09-23 | Stop reason: HOSPADM

## 2021-09-23 RX ORDER — ACETAMINOPHEN 325 MG/1
650 TABLET ORAL EVERY 4 HOURS PRN
Status: DISCONTINUED | OUTPATIENT
Start: 2021-09-23 | End: 2021-09-23 | Stop reason: HOSPADM

## 2021-09-23 RX ORDER — PROCHLORPERAZINE 25 MG
25 SUPPOSITORY, RECTAL RECTAL EVERY 12 HOURS PRN
Status: DISCONTINUED | OUTPATIENT
Start: 2021-09-23 | End: 2021-09-23 | Stop reason: HOSPADM

## 2021-09-23 RX ORDER — ONDANSETRON 4 MG/1
4 TABLET, ORALLY DISINTEGRATING ORAL EVERY 6 HOURS PRN
Status: DISCONTINUED | OUTPATIENT
Start: 2021-09-23 | End: 2021-09-23 | Stop reason: HOSPADM

## 2021-09-23 RX ORDER — ONDANSETRON 2 MG/ML
4 INJECTION INTRAMUSCULAR; INTRAVENOUS EVERY 6 HOURS PRN
Status: DISCONTINUED | OUTPATIENT
Start: 2021-09-23 | End: 2021-09-23 | Stop reason: HOSPADM

## 2021-09-23 RX ORDER — METOCLOPRAMIDE HYDROCHLORIDE 5 MG/ML
10 INJECTION INTRAMUSCULAR; INTRAVENOUS EVERY 6 HOURS PRN
Status: DISCONTINUED | OUTPATIENT
Start: 2021-09-23 | End: 2021-09-23 | Stop reason: HOSPADM

## 2021-09-23 RX ORDER — HYDROXYZINE HYDROCHLORIDE 50 MG/1
50 TABLET, FILM COATED ORAL
Status: DISCONTINUED | OUTPATIENT
Start: 2021-09-23 | End: 2021-09-23 | Stop reason: HOSPADM

## 2021-09-23 RX ORDER — DIPHENHYDRAMINE HYDROCHLORIDE 50 MG/ML
25 INJECTION INTRAMUSCULAR; INTRAVENOUS EVERY 6 HOURS PRN
Status: DISCONTINUED | OUTPATIENT
Start: 2021-09-23 | End: 2021-09-23 | Stop reason: HOSPADM

## 2021-09-23 RX ORDER — SODIUM CHLORIDE, SODIUM LACTATE, POTASSIUM CHLORIDE, CALCIUM CHLORIDE 600; 310; 30; 20 MG/100ML; MG/100ML; MG/100ML; MG/100ML
INJECTION, SOLUTION INTRAVENOUS CONTINUOUS
Status: DISCONTINUED | OUTPATIENT
Start: 2021-09-23 | End: 2021-09-23 | Stop reason: HOSPADM

## 2021-09-23 RX ORDER — ZOLPIDEM TARTRATE 10 MG/1
10 TABLET ORAL
Status: ON HOLD | COMMUNITY
End: 2021-09-23

## 2021-09-23 RX ORDER — SERTRALINE HYDROCHLORIDE 100 MG/1
100 TABLET, FILM COATED ORAL DAILY
COMMUNITY

## 2021-09-23 RX ORDER — DEXTROSE, SODIUM CHLORIDE, SODIUM LACTATE, POTASSIUM CHLORIDE, AND CALCIUM CHLORIDE 5; .6; .31; .03; .02 G/100ML; G/100ML; G/100ML; G/100ML; G/100ML
INJECTION, SOLUTION INTRAVENOUS CONTINUOUS
Status: DISCONTINUED | OUTPATIENT
Start: 2021-09-23 | End: 2021-09-23

## 2021-09-23 RX ORDER — METOCLOPRAMIDE 10 MG/1
10 TABLET ORAL EVERY 6 HOURS PRN
Status: DISCONTINUED | OUTPATIENT
Start: 2021-09-23 | End: 2021-09-23 | Stop reason: HOSPADM

## 2021-09-23 RX ADMIN — SODIUM CHLORIDE, POTASSIUM CHLORIDE, SODIUM LACTATE AND CALCIUM CHLORIDE: 600; 310; 30; 20 INJECTION, SOLUTION INTRAVENOUS at 10:04

## 2021-09-23 RX ADMIN — SODIUM CHLORIDE, POTASSIUM CHLORIDE, SODIUM LACTATE AND CALCIUM CHLORIDE 1000 ML: 600; 310; 30; 20 INJECTION, SOLUTION INTRAVENOUS at 07:57

## 2021-09-23 RX ADMIN — DIPHENHYDRAMINE HYDROCHLORIDE 25 MG: 50 INJECTION, SOLUTION INTRAMUSCULAR; INTRAVENOUS at 08:12

## 2021-09-23 RX ADMIN — ONDANSETRON 4 MG: 2 INJECTION INTRAMUSCULAR; INTRAVENOUS at 08:04

## 2021-09-23 RX ADMIN — TIZANIDINE 4 MG: 4 TABLET ORAL at 10:00

## 2021-09-23 RX ADMIN — METOCLOPRAMIDE HYDROCHLORIDE 10 MG: 5 INJECTION INTRAMUSCULAR; INTRAVENOUS at 09:33

## 2021-09-23 NOTE — PROGRESS NOTES
[facility]  OB Triage Note    HPI: Ms. Sabina Peters is a 36 year old  at 20w5d with di/di twins, who presents with worsening hyperemsis, low back and abdominal pain. She states the pain is due to muscle spasms from Edinson-Danlos. She sees a doctor at [facility] for this and takes tizanidine 4mg 6x/day. She ran out of these earlier this week because she has been vomiting up the pills. Pain is located across abdomen and wraps around to lower back. Unable to describe how often the pain occurs or how long it lasts. Can sometimes also get the pain in her arms and legs. She has zofran and reglan at home which usually help control her nausea. States she hasn't been able to eat or drink much in the past 2 days. She was seen in the ER recently and received zofran, benadryl, reglan, zyprexa, ativan and IVF. States the nausea is causing her to have a panic attack. Also admits worsening mood. Is on zoloft and was referred to behavioral health but has not yet scheduled an appointment. Occasionally has passive SI but no active SI or plan. Denies contractions, leaking fluid, vaginal bleeding.    Obstetric Complications  1. Obesity  2. Chronic pain  3. Di/di twin pregnancy  4. Chronic hypertension  5. Scant prenatal care  6. S/p gastric bypass  7. Depression   8. History of  delivery at 36 weeks    O:  Patient Vitals for the past 24 hrs:   BP Temp Temp src Resp SpO2   21 0835 -- -- -- -- 99 %   21 0830 -- -- -- -- 99 %   21 0825 -- -- -- -- 99 %   21 0820 -- -- -- -- 100 %   21 0815 (!) 144/101 -- -- -- 98 %   21 0810 -- -- -- -- 98 %   21 0805 -- -- -- -- 99 %   21 0800 -- -- -- -- 99 %   21 0740 -- -- -- -- 99 %   21 0735 -- -- -- -- 99 %   21 0730 (!) 138/99 98.1  F (36.7  C) Oral 26 98 %     Gen: Sitting up in bed rocking back and forth  CV: RRR  Pulm: Breathing comfortably on room air with normal O2  sat  Abd: Soft, gravid    Doptones of normal heart rate obtained on both fetuses     A/P:  Ms. Sabina Peters is a 36 year old  at 20w5d with di/di twins here with worsening hyperemesis, back and abdominal pain. Pain is not consistent with  contractions. Discussed that tizanidine is not well studied in pregnancy but she states she has discussed it with her doctors and it is the only thing that helps with the pain.     - S/p 1 L LR bolus and maintenance LR with D5  - s/p IV benadryl, reglan and zofran and nausea has improved enough to take home dose of tizanidine  - pain improved after oral tizanidine taken  - refill for 18 tizanidine to get patient to next prescription fill on Saturday given  - L2 US scheduled outpatient  - recommend close follow-up of chronic hypertension  - recommended physical therapy outpatient     FHR reassuring for both fetuses    Discharged to home.     Emi Jackman MD

## 2021-09-23 NOTE — PROVIDER NOTIFICATION
09/23/21 0915   Provider Notification   Provider Name/Title Dr titus   Method of Notification At Bedside   Request Evaluate in Person   Notification Reason Pain;Status Update   Dr Titus at bedside to assess pt and review history. Pt states no relief from interventions so far. Orders received for Reglan. Then will dose Tizanidine for muscle spasms. Doptones done again. Able to get two FHR's. OB updated. No toco monitor due to gestation and maternal size. Palpation done, abd soft. Pt denies any contraction pain. MD aware of vital signs, elevated blood pressure.

## 2021-09-23 NOTE — PLAN OF CARE
Data: Patient assessed in the [facility] for back/abd pain and spasms.  Cervical exam not examined.  Membranes intact.  Action:  Presumed adequate fetal oxygenation documented (see flow record). Discharge instructions reviewed.  Patient instructed to report change in fetal movement, vaginal leaking of fluid or bleeding, abdominal pain, or any concerns related to the pregnancy to her nurse/physician.    Response: Orders to discharge home per Emi Jackman.  Patient verbalized understanding of education and verbalized agreement with plan. Discharged to home at 1055.     Pt stated during assessment that she does have thoughts of self harm at times. States that it is when she is having flare ups of pain and feels like there is no relief in sight. States she has never had a plan to kill/harm herself and has never attempted to harm herself. Has resources for mental health professionals and will make appt for counseling/therapy. Continues to take Zoloft.   at bedside and very supportive to pt.

## 2021-09-23 NOTE — DISCHARGE INSTRUCTIONS
Discharge Instruction for Undelivered Patients      You were seen for: back/abd pain, muscle spasms, nausea and vomiting  We Consulted: Dr Jackman  You had (Test or Medicine):doptones for FHR, IV fluids, IV meds     Diet:   Drink 8 to 12 glasses of liquids (milk, juice, water) every day.  You may eat meals and snacks.     Activity:  Call your doctor or nurse midwife if your baby is moving less than usual.     Call your provider if you notice:  Swelling in your face or increased swelling in your hands or legs.  Headaches that are not relieved by Tylenol (acetaminophen).  Changes in your vision (blurring: seeing spots or stars.)  Nausea (sick to your stomach) and vomiting (throwing up).   Weight gain of 5 pounds or more per week.  Heartburn that doesn't go away.  Signs of bladder infection: pain when you urinate (use the toilet), need to go more often and more urgently.  The bag of pablo (rupture of membranes) breaks, or you notice leaking in your underwear.  Bright red blood in your underwear.  Abdominal (lower belly) or stomach pain.  For first baby: Contractions (tightening) less than 5 minutes apart for one hour or more.  Second (plus) baby: Contractions (tightening) less than 10 minutes apart and getting stronger.  *If less than 34 weeks: Contractions (tightening) more than 6 times in one hour.  Increase or change in vaginal discharge (note the color and amount)    Follow-up:  As scheduled in the clinic    Make follow up appt with primary OB this week or early next week. Discuss possible PT for back spasms. Pt has information to connect with mental health professional. Make appt as soon as possible.

## 2021-09-23 NOTE — PROVIDER NOTIFICATION
"   [id number]   Provider Notification   Provider Name/Title Dr titus   Method of Notification In Department   Request Evaluate in Person   Notification Reason Patient Arrived;Status Update   Data: [facility]: 2021  7:23 AM.  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 . Patient reports worsening of spasm over last few days. Had been vomiting, unable to keep any food or meds down over last couple days .  Patient is a .  Prenatal record reviewed. Pregnancy  has been complicated by twin pregnancy and obesity.  Gestational Age 20w5d. VSS. Fetal movement active, feels \"flutters\". Patient denies uterine contractions, leaking of vaginal fluid/rupture of membranes, vaginal bleeding, visual disturbances, epigastric or URQ pain, significant edema. Support person is present.   Action: Verbal consent for EFM. Triage assessment completed. Bill of rights reviewed.  Response: Patient verbalized agreement with plan. Will contact Dr Emi Titus with update and for further orders.     Order received for Zofran and IV Benadryl.  Doptone's tones done FHR of 140's on left abdomen and 150's on right abdomen. Unable to determine if two separate fetuses. Abd soft to palpation.  Pt states the pain she feels is her usual pain. Does not feel like it's contraction or any  labor. Offered heat/aromatheray. Pt declined.   "

## 2022-09-04 ENCOUNTER — HEALTH MAINTENANCE LETTER (OUTPATIENT)
Age: 37
End: 2022-09-04

## 2023-10-01 ENCOUNTER — HEALTH MAINTENANCE LETTER (OUTPATIENT)
Age: 38
End: 2023-10-01

## 2025-03-06 ENCOUNTER — TRANSFERRED RECORDS (OUTPATIENT)
Dept: HEALTH INFORMATION MANAGEMENT | Facility: CLINIC | Age: 40
End: 2025-03-06
Payer: MEDICARE

## (undated) DEVICE — LINEN FULL SHEET 5511

## (undated) DEVICE — ESU CORD MONOPOLAR 10'  E0510

## (undated) DEVICE — Device

## (undated) DEVICE — ENDO TROCAR FIRST ENTRY KII FIOS Z-THRD 05X100MM CTF03

## (undated) DEVICE — ESU PENCIL W/HOLSTER E2350H

## (undated) DEVICE — LINEN POUCH DBL 5427

## (undated) DEVICE — GLOVE PROTEXIS MICRO 6.5  2D73PM65

## (undated) DEVICE — SU VICRYL 4-0 PS-2 18" UND J496H

## (undated) DEVICE — LINEN HALF SHEET 5512

## (undated) DEVICE — GLOVE PROTEXIS BLUE W/NEU-THERA 7.0  2D73EB70

## (undated) DEVICE — ENDO POUCH UNIV RETRIEVAL SYSTEM INZII 10MM CD001

## (undated) DEVICE — SOL NACL 0.9% IRRIG 1000ML BOTTLE 2F7124

## (undated) DEVICE — LINEN TOWEL PACK X5 5464

## (undated) DEVICE — BAG CLEAR TRASH 1.3M 39X33" P4040C

## (undated) DEVICE — ESU GROUND PAD ADULT W/CORD E7507

## (undated) DEVICE — NDL 22GA 1.5"

## (undated) DEVICE — BLADE KNIFE SURG 11 371111

## (undated) DEVICE — ENDO TROCAR BLUNT TIP KII BALLOON 12X100MM C0R47

## (undated) DEVICE — CLIP APPLIER ENDO 5MM M/L LIGAMAX EL5ML

## (undated) DEVICE — SUCTION CANISTER MEDIVAC LINER 3000ML W/LID 65651-530

## (undated) DEVICE — SU VICRYL 0 UR-6 27" J603H

## (undated) DEVICE — ESU ELEC BLADE 2.75" COATED/INSULATED E1455

## (undated) DEVICE — ENDO TROCAR SLEEVE KII Z-THREADED 05X100MM CTS02

## (undated) RX ORDER — PHENYLEPHRINE HCL IN 0.9% NACL 1 MG/10 ML
SYRINGE (ML) INTRAVENOUS
Status: DISPENSED
Start: 2018-10-24

## (undated) RX ORDER — PROPOFOL 10 MG/ML
INJECTION, EMULSION INTRAVENOUS
Status: DISPENSED
Start: 2018-10-24

## (undated) RX ORDER — DIAZEPAM 5 MG
TABLET ORAL
Status: DISPENSED
Start: 2018-10-24

## (undated) RX ORDER — FENTANYL CITRATE 50 UG/ML
INJECTION, SOLUTION INTRAMUSCULAR; INTRAVENOUS
Status: DISPENSED
Start: 2018-10-24

## (undated) RX ORDER — CEFAZOLIN SODIUM 2 G/100ML
INJECTION, SOLUTION INTRAVENOUS
Status: DISPENSED
Start: 2018-10-24

## (undated) RX ORDER — BUPIVACAINE HYDROCHLORIDE AND EPINEPHRINE 2.5; 5 MG/ML; UG/ML
INJECTION, SOLUTION EPIDURAL; INFILTRATION; INTRACAUDAL; PERINEURAL
Status: DISPENSED
Start: 2018-10-24

## (undated) RX ORDER — GLYCOPYRROLATE 0.2 MG/ML
INJECTION INTRAMUSCULAR; INTRAVENOUS
Status: DISPENSED
Start: 2018-10-24

## (undated) RX ORDER — ONDANSETRON 2 MG/ML
INJECTION INTRAMUSCULAR; INTRAVENOUS
Status: DISPENSED
Start: 2018-10-24

## (undated) RX ORDER — EPHEDRINE SULFATE 50 MG/ML
INJECTION, SOLUTION INTRAVENOUS
Status: DISPENSED
Start: 2018-10-24

## (undated) RX ORDER — PROMETHAZINE HYDROCHLORIDE 25 MG/ML
INJECTION, SOLUTION INTRAMUSCULAR; INTRAVENOUS
Status: DISPENSED
Start: 2018-10-24

## (undated) RX ORDER — DEXAMETHASONE SODIUM PHOSPHATE 4 MG/ML
INJECTION, SOLUTION INTRA-ARTICULAR; INTRALESIONAL; INTRAMUSCULAR; INTRAVENOUS; SOFT TISSUE
Status: DISPENSED
Start: 2018-10-24

## (undated) RX ORDER — LIDOCAINE HYDROCHLORIDE 10 MG/ML
INJECTION, SOLUTION EPIDURAL; INFILTRATION; INTRACAUDAL; PERINEURAL
Status: DISPENSED
Start: 2018-10-24

## (undated) RX ORDER — OXYCODONE HYDROCHLORIDE 5 MG/1
TABLET ORAL
Status: DISPENSED
Start: 2018-10-24

## (undated) RX ORDER — HYDROMORPHONE HCL/0.9% NACL/PF 0.2MG/0.2
SYRINGE (ML) INTRAVENOUS
Status: DISPENSED
Start: 2018-10-24

## (undated) RX ORDER — NEOSTIGMINE METHYLSULFATE 1 MG/ML
VIAL (ML) INJECTION
Status: DISPENSED
Start: 2018-10-24

## (undated) RX ORDER — KETOROLAC TROMETHAMINE 30 MG/ML
INJECTION, SOLUTION INTRAMUSCULAR; INTRAVENOUS
Status: DISPENSED
Start: 2018-10-24